# Patient Record
Sex: MALE | Race: WHITE | HISPANIC OR LATINO | Employment: UNEMPLOYED | URBAN - METROPOLITAN AREA
[De-identification: names, ages, dates, MRNs, and addresses within clinical notes are randomized per-mention and may not be internally consistent; named-entity substitution may affect disease eponyms.]

---
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
## 2024-01-12 ENCOUNTER — SOCIAL WORK (OUTPATIENT)
Dept: BEHAVIORAL/MENTAL HEALTH CLINIC | Facility: CLINIC | Age: 19
End: 2024-01-12
Payer: COMMERCIAL

## 2024-01-12 DIAGNOSIS — F33.0 MILD RECURRENT MAJOR DEPRESSION (HCC): Primary | ICD-10-CM

## 2024-01-12 PROCEDURE — 90834 PSYTX W PT 45 MINUTES: CPT | Performed by: SOCIAL WORKER

## 2024-01-12 NOTE — PSYCH
"Behavioral Health Psychotherapy Progress Note    Psychotherapy Provided: Individual Psychotherapy     1. Mild recurrent major depression (HCC)            Goals addressed in session: Goal 1     DATA: Therapist focuses the session on building rapport and talking with Dmitriy about identifying and expressing emotions.  Dmitriy reports that he has no concerns about anything but does mention that he struggles with two peers at school.  He wishes to avoid hanging out with them and worries about telling them because he does not want to make them angry.  Dmitriy is able to narrow down that he does not want to sit with them at lunch.  We role play telling them that he wants to sit by himself.  Dmitriy states that he thinks he can do that and plans to follow through with expressing his feelings.  Otherwise Dmitriy states that he doesn't feel comfortable talking about his feelings and refuses today to talk about the past which is something he stated he wanted to work on previously.  We conclude by creating his crisis plan.  A copy is given to Dmitriy.  During this session, this clinician used the following therapeutic modalities: Engagement Strategies, Client-centered Therapy, and Supportive Psychotherapy    Substance Abuse was not addressed during this session. If the client is diagnosed with a co-occurring substance use disorder, please indicate any changes in the frequency or amount of use: NA. Stage of change for addressing substance use diagnoses: No substance use/Not applicable    ASSESSMENT:  Dmitriy Peña presents with a Euthymic/ normal mood.     his affect is Normal range and intensity, which is congruent, with his mood and the content of the session. The client has made progress on their goals.     Dmitriy Peña presents with a low risk of suicide, low risk of self-harm, and low risk of harm to others.    For any risk assessment that surpasses a \"low\" rating, a safety plan must be developed.    A safety plan " was indicated: no  If yes, describe in detail NA    PLAN: Between sessions, Dmitriy Peña will read over his safety plan and identify his concerns to discuss next session. At the next session, the therapist will use Engagement Strategies, Client-centered Therapy, and Supportive Psychotherapy to address concerns involving the past.    Behavioral Health Treatment Plan and Discharge Planning: Dmitriy Peña is aware of and agrees to continue to work on their treatment plan. They have identified and are working toward their discharge goals. yes    Visit start and stop times:    01/12/24  Start Time: 1400  Stop Time: 1445  Total Visit Time: 45 minutes   Negative

## 2024-01-12 NOTE — BH CRISIS PLAN
"Client Name: Dmitriy Peña       Client YOB: 2005    [facility] Safety Plan      Creation Date: 1/12/24    Created By: Claire Woodall LCSW       Step 1: Warning Signs:   Warning Signs   Feel heaviness in back and chest, feel really sad, repeatly hearing \"there's no point in trying anymore,\" also hear \"you're a screwup\"            Step 2: Internal Coping Strategies:   Internal Coping Strategies   Taking a shower, playing video games, taking a walk on the trails            Step 3: People and social settings that provide distraction:   Name Contact Information   Marcia Peña (mother) [phone]   Migdalia Peña (sister) [phone]   Maida Yeager [phone]    Places   [place] in [place] by [place]           Step 4: People whom I can ask for help during a crisis:      Name Contact Information    Maida Yeager [phone]      Step 5: Professionals or agencies I can contact during a crisis:      Clinican/Agency Name Phone Emergency Contact    [facility] [place] [phone]       [facility] Emergency Department Emergency Department Phone Emergency Department Address    [facility] [phone]         Crisis Phone Numbers:   Suicide Prevention Lifeline: Call or Text  243 Crisis Text Line: Text HOME to 202-301   Please note: Some [facility] do not have a separate number for Child/Adolescent specific crisis. If your county is not listed under Child/Adolescent, please call the adult number for your county      Adult Crisis Numbers: Child/Adolescent Crisis Numbers   [facility]: [phone] [facility]: [phone]   [facility]: [phone] [facility]: [phone]   [facility]: [phone] [place]: [phone]   [facility]: [phone] Critical access hospital/[facility]: [phone]   Critical access hospital/[facility]: [phone]   [facility]: [phone]   [facility]: [phone]   [facility]: [phone] (daytime) " [phone] (after hours, weekends, holidays)      Step 6: Making the environment safer (plan for lethal means safety):   Patient did not identify any lethal methods: Yes     Optional: What is most important to me and worth living for?   Girlfriend Maida  Working with nahomy Wilde Safety Plan. Trinidad Messina and Gagan Li. Used with permission of the authors.

## 2024-02-16 ENCOUNTER — SOCIAL WORK (OUTPATIENT)
Dept: BEHAVIORAL/MENTAL HEALTH CLINIC | Facility: CLINIC | Age: 19
End: 2024-02-16
Payer: COMMERCIAL

## 2024-02-16 DIAGNOSIS — F33.0 MILD RECURRENT MAJOR DEPRESSION (HCC): Primary | ICD-10-CM

## 2024-02-16 PROCEDURE — 90834 PSYTX W PT 45 MINUTES: CPT | Performed by: SOCIAL WORKER
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
## 2024-03-04 ENCOUNTER — OFFICE VISIT (OUTPATIENT)
Dept: PSYCHIATRY | Facility: CLINIC | Age: 19
End: 2024-03-04
Payer: COMMERCIAL

## 2024-03-04 DIAGNOSIS — F40.10 SOCIAL ANXIETY DISORDER: Primary | ICD-10-CM

## 2024-03-04 DIAGNOSIS — F33.1 MDD (MAJOR DEPRESSIVE DISORDER), RECURRENT EPISODE, MODERATE (HCC): ICD-10-CM

## 2024-03-04 PROCEDURE — 90833 PSYTX W PT W E/M 30 MIN: CPT | Performed by: STUDENT IN AN ORGANIZED HEALTH CARE EDUCATION/TRAINING PROGRAM

## 2024-03-04 PROCEDURE — 99214 OFFICE O/P EST MOD 30 MIN: CPT | Performed by: STUDENT IN AN ORGANIZED HEALTH CARE EDUCATION/TRAINING PROGRAM

## 2024-03-04 RX ORDER — FLUOXETINE 10 MG/1
10 CAPSULE ORAL DAILY
Qty: 90 CAPSULE | Refills: 0 | Status: SHIPPED | OUTPATIENT
Start: 2024-03-04 | End: 2024-06-02

## 2024-03-04 RX ORDER — HYDROXYZINE HYDROCHLORIDE 25 MG/1
25 TABLET, FILM COATED ORAL EVERY 6 HOURS PRN
Qty: 100 TABLET | Refills: 0 | Status: SHIPPED | OUTPATIENT
Start: 2024-03-04
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
## 2024-05-10 ENCOUNTER — TELEPHONE (OUTPATIENT)
Age: 19
End: 2024-05-10

## 2024-05-10 NOTE — TELEPHONE ENCOUNTER
Writer briefly spoke to patient. Patient was busy at this time. Patient ask writer to call back Monday to schedule appointment.   So writer will reach back out on Monday.

## 2024-05-17 NOTE — TELEPHONE ENCOUNTER
Writer spoke to patient is waiting for provider to ok the appointment date and time. Then writer will call patient and let him know if we can do that date.

## 2024-05-17 NOTE — TELEPHONE ENCOUNTER
Writer called patient back and let him know the provider is able to do Wednesday 7/24/2024 @ 9am -11 am with  for testing.     Patient was then removed from the wait list due to patient being scheduled.

## 2024-05-31 ENCOUNTER — TELEPHONE (OUTPATIENT)
Dept: PSYCHIATRY | Facility: CLINIC | Age: 19
End: 2024-05-31

## 2024-06-06 ENCOUNTER — DOCUMENTATION (OUTPATIENT)
Dept: PSYCHIATRY | Facility: CLINIC | Age: 19
End: 2024-06-06

## 2024-06-08 DIAGNOSIS — F40.10 SOCIAL ANXIETY DISORDER: ICD-10-CM

## 2024-06-08 DIAGNOSIS — F33.1 MDD (MAJOR DEPRESSIVE DISORDER), RECURRENT EPISODE, MODERATE (HCC): ICD-10-CM

## 2024-06-10 ENCOUNTER — DOCUMENTATION (OUTPATIENT)
Dept: BEHAVIORAL/MENTAL HEALTH CLINIC | Facility: CLINIC | Age: 19
End: 2024-06-10

## 2024-06-10 DIAGNOSIS — F33.0 MILD RECURRENT MAJOR DEPRESSION (HCC): Primary | ICD-10-CM

## 2024-06-10 RX ORDER — FLUOXETINE 10 MG/1
10 CAPSULE ORAL DAILY
Qty: 90 CAPSULE | Refills: 0 | Status: SHIPPED | OUTPATIENT
Start: 2024-06-10
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
## 2024-07-01 ENCOUNTER — TELEPHONE (OUTPATIENT)
Age: 19
End: 2024-07-01

## 2024-07-09 NOTE — TELEPHONE ENCOUNTER
Writer completed the application with  for the non par authorization. Writer will update this encounter when the authorization is obtained.    Sign     Unable to Pay for Housing in the Last Year: Not on file     Number of Places Lived in the Last Year: Not on file     Unstable Housing in the Last Year: No       Family History   Problem Relation Age of Onset    Diabetes Mother     Cancer Mother     Cancer Father     No Known Problems Sister     Diabetes Brother     No Known Problems Brother     No Known Problems Brother     No Known Problems Brother     No Known Problems Brother     No Known Problems Brother        Current Outpatient Medications:     traMADol (ULTRAM) 50 MG tablet, Take 1 tablet by mouth every 6 hours as needed for Pain for up to 14 days. Intended supply: 7 days. Take lowest dose possible to manage pain Max Daily Amount: 200 mg, Disp: 56 tablet, Rfl: 0    ibuprofen (ADVIL;MOTRIN) 800 MG tablet, Take 1 tablet by mouth 3 times daily as needed for Pain, Disp: 90 tablet, Rfl: 1    lisinopril (PRINIVIL;ZESTRIL) 40 MG tablet, TAKE 1 TABLET BY MOUTH EVERY DAY, Disp: 90 tablet, Rfl: 3    amLODIPine (NORVASC) 10 MG tablet, TAKE 1 TABLET BY MOUTH EVERY DAY, Disp: 90 tablet, Rfl: 3    spironolactone (ALDACTONE) 25 MG tablet, TAKE 1 TABLET BY MOUTH EVERY DAY, Disp: 90 tablet, Rfl: 3    fluticasone (FLONASE) 50 MCG/ACT nasal spray, SPRAY 1 SPRAY INTO EACH NOSTRIL EVERY DAY Strength: 50 MCG/ACT, Disp: 1 each, Rfl: 11    fluticasone-umeclidin-vilant (TRELEGY ELLIPTA) 200-62.5-25 MCG/ACT AEPB inhaler, Inhale 1 puff into the lungs daily, Disp: 60 each, Rfl: 11    empagliflozin (JARDIANCE) 25 MG tablet, Take 1 tablet by mouth daily To replace 10 mg, Disp: 90 tablet, Rfl: 3    atorvastatin (LIPITOR) 40 MG tablet, Take 1 tablet by mouth daily To replace simvastatin, Disp: 90 tablet, Rfl: 3    furosemide (LASIX) 20 MG tablet, TAKE 1 TO 2 TABLETS BY MOUTH TWICE A DAY (Patient taking differently: 2 tablets daily TAKE 1 TO 2 TABLETS BY MOUTH TWICE A DAY), Disp: 360 tablet, Rfl: 3    glipiZIDE (GLUCOTROL XL) 5 MG extended release tablet, TAKE 2 TABLETS BY MOUTH

## 2024-07-10 ENCOUNTER — OFFICE VISIT (OUTPATIENT)
Dept: PSYCHIATRY | Facility: CLINIC | Age: 19
End: 2024-07-10

## 2024-07-10 ENCOUNTER — TELEMEDICINE (OUTPATIENT)
Dept: BEHAVIORAL/MENTAL HEALTH CLINIC | Facility: CLINIC | Age: 19
End: 2024-07-10
Payer: COMMERCIAL

## 2024-07-10 DIAGNOSIS — F33.0 MILD RECURRENT MAJOR DEPRESSION (HCC): Primary | ICD-10-CM

## 2024-07-10 DIAGNOSIS — F90.9 ATTENTION DEFICIT HYPERACTIVITY DISORDER (ADHD), UNSPECIFIED ADHD TYPE: ICD-10-CM

## 2024-07-10 DIAGNOSIS — F33.1 MDD (MAJOR DEPRESSIVE DISORDER), RECURRENT EPISODE, MODERATE (HCC): Primary | ICD-10-CM

## 2024-07-10 DIAGNOSIS — F40.10 SOCIAL ANXIETY DISORDER: ICD-10-CM

## 2024-07-10 PROCEDURE — 90834 PSYTX W PT 45 MINUTES: CPT | Performed by: SOCIAL WORKER

## 2024-07-10 RX ORDER — FLUOXETINE HYDROCHLORIDE 20 MG/1
20 CAPSULE ORAL
Qty: 30 CAPSULE | Refills: 0 | Status: SHIPPED | OUTPATIENT
Start: 2024-07-10 | End: 2024-08-09

## 2024-07-10 RX ORDER — ATOMOXETINE 25 MG/1
25 CAPSULE ORAL
Qty: 30 CAPSULE | Refills: 0 | Status: SHIPPED | OUTPATIENT
Start: 2024-07-10 | End: 2024-08-09

## 2024-07-10 RX ORDER — HYDROXYZINE HYDROCHLORIDE 25 MG/1
25 TABLET, FILM COATED ORAL EVERY 6 HOURS PRN
Qty: 100 TABLET | Refills: 0 | Status: SHIPPED | OUTPATIENT
Start: 2024-07-10

## 2024-07-10 NOTE — PSYCH
"Behavioral Health Psychotherapy Progress Note    Psychotherapy Provided: {PSYCHOTHERAPY:15368}    1. Mild recurrent major depression (HCC)            Goals addressed in session: {GOALS:95660}     DATA: ***  During this session, this clinician used the following therapeutic modalities: {Therapeutic Modalities:77083}    Substance Abuse {Was/was not:20590} addressed during this session. If the client is diagnosed with a co-occurring substance use disorder, please indicate any changes in the frequency or amount of use: ***. Stage of change for addressing substance use diagnoses: {Psych Substance Abuse Stage of Change:03649}    ASSESSMENT:  Dmitriy Peña presents with a {Psych/BH Mood:80022::\"Euthymic/ normal\"} mood.     his affect is {Psych/BH Affect:93675::\"Normal range and intensity\"}, which is {Congruent/Non Congruent:18724}, with his mood and the content of the session. The client {HAS/HAS NOT:33712} made progress on their goals.    *** Dmitriy Peña presents with a {PSYCH Suicide/Homicide Risk:43383} risk of suicide, {PSYCH Suicide/Homicide Risk:89455} risk of self-harm, and {PSYCH Suicide/Homicide Risk:44932} risk of harm to others.    For any risk assessment that surpasses a \"low\" rating, a safety plan must be developed.    A safety plan was indicated: {YES/NO:20200}  If yes, describe in detail ***    PLAN: Between sessions, Dmitriy Peña will ***. At the next session, the therapist will use {Therapeutic Modalities:85404} to address ***.    Behavioral Health Treatment Plan and Discharge Planning: Dmitriy Peña is aware of and agrees to continue to work on their treatment plan. They have identified and are working toward their discharge goals. {YES/NO:20200}    Visit start and stop times:    07/10/24       Virtual Regular Visit    Verification of patient location:    Patient is located at {[place] Patient Location:[address]} in the following state in which I hold an active license {[employer] " patient location:[address]}      Assessment/Plan:    Problem List Items Addressed This Visit       Mild recurrent major depression (HCC) - Primary       Goals addressed in session: {GOALS:22611}          Reason for visit is No chief complaint on file.       Encounter provider Claire Woodall LCSW      Recent Visits  No visits were found meeting these conditions.  Showing recent visits within past 7 days and meeting all other requirements  Future Appointments  No visits were found meeting these conditions.  Showing future appointments within next 150 days and meeting all other requirements       The patient was identified by name and date of birth. Dmitriy Peña was informed that this is a telemedicine visit and that the visit is being conducted through{[employer] VIRTUAL VISIT MEDIUM:20253}.  {Telemedicine confidentiality :79394} {Telemedicine participants:79014}  He acknowledged consent and understanding of privacy and security of the video platform. The patient has agreed to participate and understands they can discontinue the visit at any time.    Patient is aware this is a billable service.     Subjective  Dmitriy Peña is a 18 y.o. male *** .      HPI     Past Medical History:   Diagnosis Date    Depression        Past Surgical History:   Procedure Laterality Date    HERNIA REPAIR      TOE SURGERY         Current Outpatient Medications   Medication Sig Dispense Refill    atoMOXetine (STRATTERA) 25 mg capsule Take 1 capsule (25 mg total) by mouth daily after breakfast 30 capsule 0    FLUoxetine (PROzac) 20 mg capsule Take 1 capsule (20 mg total) by mouth daily after breakfast 30 capsule 0    hydrOXYzine HCL (ATARAX) 25 mg tablet Take 1 tablet (25 mg total) by mouth every 6 (six) hours as needed for anxiety for up to 100 doses 100 tablet 0     No current facility-administered medications for this visit.        No Known Allergies    Review of Systems    Video Exam    There were no vitals filed for this  visit.    Physical Exam     Visit Time    Visit Start Time: ***  Visit Stop Time: ***  Total Visit Duration: {Psych Total Visit Time:37471}

## 2024-07-10 NOTE — PSYCH
"Behavioral Health Psychotherapy Progress Note    Psychotherapy Provided: Individual Psychotherapy     1. Mild recurrent major depression (HCC)            Goals addressed in session: Goal 1     DATA: Attila states that he has been feeling very anxious about the fact that his uncle \"wants [him] to get [his] priorities straight.\"  Attila explains that he is off for the summer and has not been doing much of anything and his uncle is asking that he contribute more financially.  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  Therapist explores with Attila a plan for improved sleep schedule.  Attila agrees to place his phone outside his bedroom and to use a dehumidifier to create white noise.  Therapist also guides him through two visualizations - one being down by the river and the other holding his cat Mica to be used as resources when having difficulty falling asleep or feeling anxious.  Attila established the goal of going to sleep at 9:00 pm at night and waking at 8:00 am.  He also sets a goal of volunteering Mondays and Fridays at the cat shelter.  We conclude by discussing therapist's resignation.  Attila agrees to proceed to the ER or the [facility] if feeling like he may hurt himself or overwhelming anxiety.  He also agrees to therapist having him placed on a waitlist for a new therapist.  During this session, this clinician used the following therapeutic modalities: Client-centered Therapy, Mindfulness-based Strategies, and Supportive Psychotherapy    Substance Abuse was not addressed during this session. If the client is diagnosed with a co-occurring substance use disorder, please indicate any changes in the frequency or amount of use: NA. Stage of change for addressing substance use diagnoses: No substance use/Not applicable    ASSESSMENT:  Dmitriy Peña presents with a Anxious mood.     his affect is Blunted, which is congruent, with his " "mood and the content of the session. The client has made progress on their goals.     Dmitriy Peña presents with a none risk of suicide, none risk of self-harm, and none risk of harm to others.    For any risk assessment that surpasses a \"low\" rating, a safety plan must be developed.    A safety plan was indicated: no  If yes, describe in detail NA    PLAN: Max will be placed on waitlist for a new therapist.      Visit start and stop times:    07/10/24  Start Time: 1258  Stop Time: 1340  Total Visit Time: 42 minutes  Virtual Regular Visit    Verification of patient location:    Patient is located at Home in the following state in which I hold an active license [place]      Assessment/Plan:    Problem List Items Addressed This Visit       Mild recurrent major depression (HCC) - Primary       Goals addressed in session: Goal 1          Reason for visit is No chief complaint on file.       Encounter provider AMINA Thompson      Recent Visits  No visits were found meeting these conditions.  Showing recent visits within past 7 days and meeting all other requirements  Future Appointments  No visits were found meeting these conditions.  Showing future appointments within next 150 days and meeting all other requirements       The patient was identified by name and date of birth. Dmitriy Peña was informed that this is a telemedicine visit and that the visit is being conducted throughthe Epic Embedded platform. He agrees to proceed..  My office door was closed. No one else was in the room.  He acknowledged consent and understanding of privacy and security of the video platform. The patient has agreed to participate and understands they can discontinue the visit at any time.    Patient is aware this is a billable service.     Subjective  Dmitriy Peña is a 18 y.o. male  .      HPI     Past Medical History:   Diagnosis Date    Depression        Past Surgical History:   Procedure Laterality Date    HERNIA REPAIR      " TOE SURGERY         Current Outpatient Medications   Medication Sig Dispense Refill    atoMOXetine (STRATTERA) 25 mg capsule Take 1 capsule (25 mg total) by mouth daily after breakfast 30 capsule 0    FLUoxetine (PROzac) 20 mg capsule Take 1 capsule (20 mg total) by mouth daily after breakfast 30 capsule 0    hydrOXYzine HCL (ATARAX) 25 mg tablet Take 1 tablet (25 mg total) by mouth every 6 (six) hours as needed for anxiety for up to 100 doses 100 tablet 0     No current facility-administered medications for this visit.        No Known Allergies    Review of Systems    Video Exam    There were no vitals filed for this visit.    Physical Exam
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
## 2024-07-12 ENCOUNTER — TELEPHONE (OUTPATIENT)
Dept: PSYCHIATRY | Facility: CLINIC | Age: 19
End: 2024-07-12

## 2024-07-22 ENCOUNTER — TELEPHONE (OUTPATIENT)
Age: 19
End: 2024-07-22

## 2024-07-22 NOTE — TELEPHONE ENCOUNTER
Writer returned patients call. Let him know that we could do the intake VV but the testing would need to be done @[facility] and did he want to cancel for now and reschedule when he has a ride situated. Patient choose to wait for testing until he knew for sure that he could get a ride to the testing appointments.

## 2024-07-22 NOTE — TELEPHONE ENCOUNTER
Writer received call from patient stating he wouldn't be able to make it. He can't get a ride. Writer let patient know I would reach out to provider to see if we can do the intake VV. Writer let patient know that I would let him know what we could do but for testing he would have to come to the [address] [place] [address] location.

## 2024-08-21 ENCOUNTER — OFFICE VISIT (OUTPATIENT)
Dept: URGENT CARE | Facility: CLINIC | Age: 19
End: 2024-08-21
Payer: COMMERCIAL

## 2024-08-21 VITALS
TEMPERATURE: 98 F | OXYGEN SATURATION: 100 % | WEIGHT: 211 LBS | HEART RATE: 82 BPM | HEIGHT: 66 IN | BODY MASS INDEX: 33.91 KG/M2 | SYSTOLIC BLOOD PRESSURE: 138 MMHG | DIASTOLIC BLOOD PRESSURE: 78 MMHG | RESPIRATION RATE: 18 BRPM

## 2024-08-21 DIAGNOSIS — K02.9 DENTAL DECAY: ICD-10-CM

## 2024-08-21 DIAGNOSIS — K08.89 PAIN, DENTAL: Primary | ICD-10-CM

## 2024-08-21 PROCEDURE — S9083 URGENT CARE CENTER GLOBAL: HCPCS | Performed by: NURSE PRACTITIONER

## 2024-08-21 PROCEDURE — 99214 OFFICE O/P EST MOD 30 MIN: CPT | Performed by: NURSE PRACTITIONER

## 2024-08-21 RX ORDER — AMOXICILLIN 500 MG/1
500 CAPSULE ORAL EVERY 8 HOURS SCHEDULED
Qty: 21 CAPSULE | Refills: 0 | Status: SHIPPED | OUTPATIENT
Start: 2024-08-21 | End: 2024-08-28

## 2024-08-21 RX ORDER — CHLORHEXIDINE GLUCONATE ORAL RINSE 1.2 MG/ML
15 SOLUTION DENTAL 2 TIMES DAILY
Qty: 473 ML | Refills: 0 | Status: SHIPPED | OUTPATIENT
Start: 2024-08-21

## 2024-08-21 NOTE — PATIENT INSTRUCTIONS
You have been prescribed amoxicillin and peridex oral rinse.  Take and use as prescribed.   Follow up with a dentist  Follow up with your PCP in 3-5 days  Go to the ED if symptoms worsen

## 2024-08-21 NOTE — PROGRESS NOTES
[facility]        NAME: Dmitriy Peña is a 18 y.o. male  : 2005    MRN: [id number]  DATE: 2024  TIME: 12:06 PM    Assessment and Plan   Pain, dental [K08.89]  1. Pain, dental  amoxicillin (AMOXIL) 500 mg capsule    chlorhexidine (PERIDEX) 0.12 % solution      2. Dental decay  amoxicillin (AMOXIL) 500 mg capsule    chlorhexidine (PERIDEX) 0.12 % solution            Patient Instructions       Follow up with PCP in 3-5 days.  Proceed to  ER if symptoms worsen.    If tests have been performed at [facility], our office will contact you with results if changes need to be made to the care plan discussed with you at the visit.  You can review your full results on [facility].    You have been prescribed amoxicillin and peridex oral rinse.  Take and use as prescribed.   Follow up with a dentist  Follow up with your PCP in 3-5 days  Go to the ED if symptoms worsen       Chief Complaint     Chief Complaint   Patient presents with    Dental Pain     Toothache x 2 days          History of Present Illness       This is a 18 year old male who states has dental pain in left upper x 2 days. HE has not contacted a dentist. States last motrin was at 10am.   PMH is listed.     Dental Pain         Review of Systems   Review of Systems   Constitutional: Negative.    HENT:  Positive for dental problem.    Eyes: Negative.    Respiratory: Negative.     Cardiovascular: Negative.    Gastrointestinal: Negative.    Endocrine: Negative.    Genitourinary: Negative.    Musculoskeletal: Negative.    Skin: Negative.    Allergic/Immunologic: Negative.    Neurological: Negative.    Hematological: Negative.    Psychiatric/Behavioral: Negative.           Current Medications       Current Outpatient Medications:     amoxicillin (AMOXIL) 500 mg capsule, Take 1 capsule (500 mg total) by mouth every 8 (eight) hours for 7 days, Disp: 21 capsule, Rfl: 0    chlorhexidine (PERIDEX) 0.12 % solution, Apply 15 mL to the  "mouth or throat 2 (two) times a day, Disp: 473 mL, Rfl: 0    atoMOXetine (STRATTERA) 25 mg capsule, Take 1 capsule (25 mg total) by mouth daily after breakfast, Disp: 30 capsule, Rfl: 0    FLUoxetine (PROzac) 20 mg capsule, Take 1 capsule (20 mg total) by mouth daily after breakfast, Disp: 30 capsule, Rfl: 0    hydrOXYzine HCL (ATARAX) 25 mg tablet, Take 1 tablet (25 mg total) by mouth every 6 (six) hours as needed for anxiety for up to 100 doses, Disp: 100 tablet, Rfl: 0    Current Allergies     Allergies as of 08/21/2024    (No Known Allergies)            The following portions of the patient's history were reviewed and updated as appropriate: allergies, current medications, past family history, past medical history, past social history, past surgical history and problem list.     Past Medical History:   Diagnosis Date    Depression        Past Surgical History:   Procedure Laterality Date    HERNIA REPAIR      TOE SURGERY         Family History   Problem Relation Age of Onset    No Known Problems Mother     No Known Problems Father     Cancer Maternal Grandfather          Medications have been verified.        Objective   /78   Pulse 82   Temp 98 °F (36.7 °C)   Resp 18   Ht 5' 6\" (1.676 m)   Wt 95.7 kg (211 lb)   SpO2 100%   BMI 34.06 kg/m²   No LMP for male patient.       Physical Exam     Physical Exam  Vitals and nursing note reviewed.   Constitutional:       General: He is not in acute distress.     Appearance: Normal appearance. He is obese. He is not ill-appearing, toxic-appearing or diaphoretic.   HENT:      Head: Normocephalic and atraumatic.      Nose: Nose normal.      Mouth/Throat:      Mouth: Mucous membranes are moist.     Eyes:      Extraocular Movements: Extraocular movements intact.   Cardiovascular:      Rate and Rhythm: Normal rate.      Pulses: Normal pulses.   Pulmonary:      Effort: Pulmonary effort is normal.   Musculoskeletal:         General: Normal range of motion.      " Cervical back: Normal range of motion.   Skin:     General: Skin is warm and dry.      Capillary Refill: Capillary refill takes less than 2 seconds.   Neurological:      General: No focal deficit present.      Mental Status: He is alert and oriented to person, place, and time.   Psychiatric:         Mood and Affect: Mood normal.         Behavior: Behavior normal.         Thought Content: Thought content normal.         Judgment: Judgment normal.

## 2024-09-11 ENCOUNTER — OFFICE VISIT (OUTPATIENT)
Dept: PSYCHIATRY | Facility: CLINIC | Age: 19
End: 2024-09-11
Payer: COMMERCIAL

## 2024-09-11 DIAGNOSIS — F33.1 MDD (MAJOR DEPRESSIVE DISORDER), RECURRENT EPISODE, MODERATE (HCC): ICD-10-CM

## 2024-09-11 DIAGNOSIS — F90.9 ATTENTION DEFICIT HYPERACTIVITY DISORDER (ADHD), UNSPECIFIED ADHD TYPE: ICD-10-CM

## 2024-09-11 DIAGNOSIS — F40.10 SOCIAL ANXIETY DISORDER: ICD-10-CM

## 2024-09-11 PROCEDURE — 90833 PSYTX W PT W E/M 30 MIN: CPT | Performed by: STUDENT IN AN ORGANIZED HEALTH CARE EDUCATION/TRAINING PROGRAM

## 2024-09-11 PROCEDURE — 99214 OFFICE O/P EST MOD 30 MIN: CPT | Performed by: STUDENT IN AN ORGANIZED HEALTH CARE EDUCATION/TRAINING PROGRAM

## 2024-09-11 RX ORDER — IBUPROFEN 800 MG/1
800 TABLET, FILM COATED ORAL EVERY 8 HOURS PRN
COMMUNITY
Start: 2024-08-22

## 2024-09-11 RX ORDER — HYDROXYZINE HYDROCHLORIDE 25 MG/1
25 TABLET, FILM COATED ORAL EVERY 6 HOURS PRN
Qty: 100 TABLET | Refills: 0 | Status: SHIPPED | OUTPATIENT
Start: 2024-09-11

## 2024-09-11 RX ORDER — ATOMOXETINE 25 MG/1
25 CAPSULE ORAL
Qty: 30 CAPSULE | Refills: 2 | Status: SHIPPED | OUTPATIENT
Start: 2024-09-11 | End: 2024-12-10

## 2024-09-11 NOTE — BH TREATMENT PLAN
TREATMENT PLAN (Medication Management Only)        [facility] - [facility]YCHIATRIC [facility]    Name and Date of Birth:  Dmitriy Peña 18 y.o. 2005  Date of Treatment Plan: September 11, 2024  Diagnosis/Diagnoses:    1. Attention deficit hyperactivity disorder (ADHD), unspecified ADHD type    2. Social anxiety disorder    3. MDD (major depressive disorder), recurrent episode, moderate (HCC)      Strengths/Personal Resources for Self-Care: taking medications as prescribed, ability to adapt to life changes, ability to communicate needs, ability to communicate well, ability to listen, ability to reason, ability to understand psychiatric illness, average or above intelligence, family ties, general fund of knowledge, good physical health, good understanding of illness, motivation for treatment, ability to negotiate basic needs, being resoureceful, self-reliance, sense of humor, special hobby/interest.  Area/Areas of need (in own words): anxiety symptoms, depressive symptoms, sleep problems, attention and concentration problems, anger control  1. Long Term Goal: improve compulsive behavior.  Target Date:6 months - 3/11/2025  Person/Persons responsible for completion of goal: Dmitriy  2.  Short Term Objective (s) - How will we reach this goal?:   A. Provider new recommended medication/dosage changes and/or continue medication(s): continue current medications as prescribed Prozac.  B. Take psychiatric medications responsibly.  C. Keep all scheduled appointments.  Target Date:6 months - 3/11/2025  Person/Persons Responsible for Completion of Goal: Dmitriy  Progress Towards Goals: starting treatment  Treatment Modality: medication management with psychotherapy every 3 months, referral for individual psychotherapy, referral for neuropsychological assessment  Review due 180 days from date of this plan: 6 months - 3/11/2025  Expected length of service: ongoing treatment  My Physician and I have  developed this plan together and I agree to work on the goals and objectives. I understand the treatment goals that were developed for my treatment.
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
## 2024-09-19 ENCOUNTER — APPOINTMENT (OUTPATIENT)
Dept: RADIOLOGY | Facility: CLINIC | Age: 19
End: 2024-09-19
Payer: COMMERCIAL

## 2024-09-19 ENCOUNTER — OFFICE VISIT (OUTPATIENT)
Dept: URGENT CARE | Facility: CLINIC | Age: 19
End: 2024-09-19
Payer: COMMERCIAL

## 2024-09-19 VITALS
TEMPERATURE: 98 F | SYSTOLIC BLOOD PRESSURE: 130 MMHG | HEART RATE: 62 BPM | WEIGHT: 200 LBS | HEIGHT: 65 IN | RESPIRATION RATE: 18 BRPM | OXYGEN SATURATION: 98 % | DIASTOLIC BLOOD PRESSURE: 70 MMHG | BODY MASS INDEX: 33.32 KG/M2
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
## 2024-09-19 DIAGNOSIS — M79.652 ACUTE PAIN OF LEFT THIGH: ICD-10-CM

## 2024-09-19 DIAGNOSIS — S69.92XA LEFT WRIST INJURY, INITIAL ENCOUNTER: ICD-10-CM

## 2024-09-19 DIAGNOSIS — S70.12XA HEMATOMA OF LEFT THIGH, INITIAL ENCOUNTER: ICD-10-CM

## 2024-09-19 DIAGNOSIS — S63.502A WRIST SPRAIN, LEFT, INITIAL ENCOUNTER: Primary | ICD-10-CM

## 2024-09-19 PROCEDURE — S9083 URGENT CARE CENTER GLOBAL: HCPCS | Performed by: NURSE PRACTITIONER

## 2024-09-19 PROCEDURE — 73552 X-RAY EXAM OF FEMUR 2/>: CPT

## 2024-09-19 PROCEDURE — 73110 X-RAY EXAM OF WRIST: CPT

## 2024-09-19 PROCEDURE — 99213 OFFICE O/P EST LOW 20 MIN: CPT | Performed by: NURSE PRACTITIONER

## 2024-09-19 NOTE — PATIENT INSTRUCTIONS
Your xray was preliminarily read by your provider.  A radiologist will read the xray and you will be notified if it is abnormal.    You are to Rest, Ice, compression (ace wrap, splint) elevate  You are to take tylenol and/or motrin for pain  Wear the brace for comfort and pain and take off and exercise several times a day.   If symptoms continue after 7 days - see orthopedics.  You have been given a referral - you can call for appointment    If tests have been performed at [facility], our office will contact you with results if changes need to be made to the care plan discussed with you at the visit.  You can review your full results on [facility] MyChart.
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
## 2024-10-24 ENCOUNTER — TELEPHONE (OUTPATIENT)
Age: 19
End: 2024-10-24

## 2024-10-24 ENCOUNTER — TELEPHONE (OUTPATIENT)
Dept: PSYCHIATRY | Facility: CLINIC | Age: 19
End: 2024-10-24

## 2024-10-24 NOTE — TELEPHONE ENCOUNTER
Pt removed from TT wait list due to being placed on for a NONA from Claire Clarke forwarded to Support Services.

## 2024-10-24 NOTE — TELEPHONE ENCOUNTER
Called and spoke to patient about NONA request. Offered TT therapist at [facility]. Patient stated that he does not drive and would need to discuss this with his ride first. Will call back if interested, will remain on wait list for a therapist closer to his home. Transfer to psych support services upon call back.    If patient calls back and accepts, can schedule with Yuko DUPREE

## 2024-11-04 ENCOUNTER — TELEPHONE (OUTPATIENT)
Dept: PSYCHIATRY | Facility: CLINIC | Age: 19
End: 2024-11-04

## 2024-11-15 ENCOUNTER — OFFICE VISIT (OUTPATIENT)
Dept: BEHAVIORAL/MENTAL HEALTH CLINIC | Facility: CLINIC | Age: 19
End: 2024-11-15
Payer: COMMERCIAL

## 2024-11-15 DIAGNOSIS — F33.0 MILD RECURRENT MAJOR DEPRESSION (HCC): Primary | ICD-10-CM

## 2024-11-15 PROCEDURE — 90834 PSYTX W PT 45 MINUTES: CPT

## 2024-11-18 NOTE — PSYCH
"Behavioral Health Psychotherapy Progress Note    Psychotherapy Provided: Individual Psychotherapy     1. Mild recurrent major depression (HCC)            Goals addressed in session: Goal 1     DATA: Met with Attila for NONA session. Session focused on rapport building and information gathering. Processed what he was attempting to work on with his previous therapist. Attila expressed that he struggles with depression and trauma related symptoms. He has moved often throughout his life due to \"mom having bad boyfriends.\" Attila currently lives with his uncle and cousin and attends [employer]. He expressed his uncle gets angry easily, but overall he is comfortable in the home. He expressed the last year and a half has been the longest length of stay he can remember being in one home. He did experience homelessness in his past. Attila has a small support system. He struggles with poor self-esteem. He is future focused and is hopeful to talk with recruiters for both [employer] and [employer]. He is hopeful to join the  to assist him in bettering his life. Attila recently had a break up with his girlfriend. He expressed sadness related to this.  Next session will focus on building his new treatment plan.   During this session, this clinician used the following therapeutic modalities: Client-centered Therapy and Supportive Psychotherapy    Substance Abuse was not addressed during this session. If the client is diagnosed with a co-occurring substance use disorder, please indicate any changes in the frequency or amount of use: n/a. Stage of change for addressing substance use diagnoses: No substance use/Not applicable    ASSESSMENT:  Dmitriy Peña presents with a Anxious mood.     his affect is Flat, which is congruent, with his mood and the content of the session. The client has not made progress on their goals.     Dmitriy Peña presents with a none risk of suicide, none risk of self-harm, and none risk of harm to " "others.    For any risk assessment that surpasses a \"low\" rating, a safety plan must be developed.    A safety plan was indicated: no  If yes, describe in detail n/a    PLAN: Between sessions, Dmitriy Peña will use natural supports and coping skills. At the next session, the therapist will use Client-centered Therapy and Cognitive Behavioral Therapy to address depression.    Behavioral Health Treatment Plan and Discharge Planning: Dmitriy Peña is aware of and agrees to continue to work on their treatment plan. They have identified and are working toward their discharge goals. yes    Visit start and stop times:    11/15/24  Start Time: 1445  Stop Time: 1530  Total Visit Time: 45 minutes  "

## 2024-12-13 ENCOUNTER — OFFICE VISIT (OUTPATIENT)
Dept: BEHAVIORAL/MENTAL HEALTH CLINIC | Facility: CLINIC | Age: 19
End: 2024-12-13
Payer: COMMERCIAL

## 2024-12-13 DIAGNOSIS — F33.0 MILD RECURRENT MAJOR DEPRESSION (HCC): Primary | ICD-10-CM

## 2024-12-13 PROCEDURE — 90834 PSYTX W PT 45 MINUTES: CPT

## 2024-12-16 NOTE — PSYCH
"Behavioral Health Psychotherapy Progress Note    Psychotherapy Provided: Individual Psychotherapy     1. Mild recurrent major depression (HCC)            Goals addressed in session: Goal 1     DATA: Met with Attila. Session began with open ended questions to gain feedback on mood. He expressed that he has been in better spirits since he is back with his girlfriend. He reports this helps lift his mood. He continues to struggle with sleep and bedwetting. Reviewed healthy sleep hygiene routines. Attila also reports he struggles with concentration and focus. This causes conflict in the home because he does not complete his needed chores. He reports his uncle gets angry and yells at him when he does not complete his chores. Discussed creating lists to better help with task management. Session focused on coping skills and tasks management. He wants to call the [employer]  since he did not come to his school yet. He did speak with [employer], but feels he will be a better fit for [employer].   During this session, this clinician used the following therapeutic modalities: Client-centered Therapy and Cognitive Behavioral Therapy    Substance Abuse was not addressed during this session. If the client is diagnosed with a co-occurring substance use disorder, please indicate any changes in the frequency or amount of use: n/a. Stage of change for addressing substance use diagnoses: No substance use/Not applicable    ASSESSMENT:  Dmitriy Peña presents with a Euthymic/ normal mood.     his affect is Normal range and intensity, which is congruent, with his mood and the content of the session. The client has not made progress on their goals.     Dmitriy Peña presents with a none risk of suicide, none risk of self-harm, and none risk of harm to others.    For any risk assessment that surpasses a \"low\" rating, a safety plan must be developed.    A safety plan was indicated: no  If yes, describe in detail n/a    PLAN: Between sessions, Dmitriy" Casey will use natural supports and coping skills. At the next session, the therapist will use Cognitive Behavioral Therapy and Mindfulness-based Strategies to address emotion regulation.    Behavioral Health Treatment Plan and Discharge Planning: Dmitriy Peña is aware of and agrees to continue to work on their treatment plan. They have identified and are working toward their discharge goals. yes    Depression Follow-up Plan Completed: Not applicable    Visit start and stop times:    12/13/24  Start Time: 1500  Stop Time: 1538  Total Visit Time: 38 minutes

## 2024-12-18 ENCOUNTER — OFFICE VISIT (OUTPATIENT)
Dept: URGENT CARE | Facility: CLINIC | Age: 19
End: 2024-12-18
Payer: COMMERCIAL

## 2024-12-18 VITALS
TEMPERATURE: 98 F | OXYGEN SATURATION: 96 % | HEART RATE: 74 BPM | SYSTOLIC BLOOD PRESSURE: 122 MMHG | DIASTOLIC BLOOD PRESSURE: 62 MMHG | RESPIRATION RATE: 20 BRPM

## 2024-12-18 DIAGNOSIS — R68.89 FLU-LIKE SYMPTOMS: Primary | ICD-10-CM

## 2024-12-18 DIAGNOSIS — Z20.828 CONTACT WITH OR EXPOSURE TO VIRAL DISEASE: ICD-10-CM

## 2024-12-18 DIAGNOSIS — R11.2 NAUSEA AND VOMITING, UNSPECIFIED VOMITING TYPE: ICD-10-CM

## 2024-12-18 PROCEDURE — S9083 URGENT CARE CENTER GLOBAL: HCPCS | Performed by: NURSE PRACTITIONER

## 2024-12-18 PROCEDURE — 87636 SARSCOV2 & INF A&B AMP PRB: CPT | Performed by: NURSE PRACTITIONER

## 2024-12-18 PROCEDURE — 99214 OFFICE O/P EST MOD 30 MIN: CPT | Performed by: NURSE PRACTITIONER

## 2024-12-18 RX ORDER — ONDANSETRON 4 MG/1
4 TABLET, FILM COATED ORAL EVERY 8 HOURS PRN
Qty: 20 TABLET | Refills: 0 | Status: SHIPPED | OUTPATIENT
Start: 2024-12-18

## 2024-12-18 RX ORDER — OSELTAMIVIR PHOSPHATE 75 MG/1
75 CAPSULE ORAL EVERY 12 HOURS SCHEDULED
Qty: 10 CAPSULE | Refills: 0 | Status: SHIPPED | OUTPATIENT
Start: 2024-12-18 | End: 2024-12-23

## 2024-12-18 NOTE — PATIENT INSTRUCTIONS
You are to take the tamiflu and zofran as prescribed.   You appear to have covid or flu.   You have covid and flu test pending.  Results should be back in 2 days.    You have flu like symptoms.  You are to rest. Drink gatorade or pedialyte for rehydration.    You are to eat a BRAT diet - bananas, rice, applesauce and toast.  You may try imodium for diarrhea.  Take tylenol or motrin for fever or pain.   You are to download  mychart for the results in 24-48 hours.  You will be notified if the results are positive.  You are to mask as long as you are coughing, feverish or exhibiting symptoms.    Follow up with your PCP in 2-3 days  Go to the ED if symptoms worsen.      You appear to have covid/symptoms.  You have a covid test pending.  You are to download [facility] mychart for the results in 24-48 hours.   You will be notified if results are +.    You are to take vitamin C, D3,  plain robitussin for cough.  Do not take cough suppressants; you want to take an expectorant.  Sleep on your stomach.   You are to quarantine as required per CDC guidelines of 24 hours.  Mask as long as you are ill, feverish or coughing.     See your PCP for follow up in 2-3 days.    Go to the ED if symptoms worsen or are severe.     If tests have been performed at [facility], our office will contact you with results if changes need to be made to the care plan discussed with you at the visit.  You can review your full results on [facility] MyChart.

## 2024-12-18 NOTE — PROGRESS NOTES
[facility]        NAME: Dmitriy Peña is a 19 y.o. male  : 2005    MRN: [id number]  DATE: 2024  TIME: 9:19 AM    Assessment and Plan   Flu-like symptoms [R68.89]  1. Flu-like symptoms  oseltamivir (TAMIFLU) 75 mg capsule    ondansetron (ZOFRAN) 4 mg tablet      2. Nausea and vomiting, unspecified vomiting type  oseltamivir (TAMIFLU) 75 mg capsule    ondansetron (ZOFRAN) 4 mg tablet      3. Contact with or exposure to viral disease  Covid/Flu- Office Collect Normal    Covid/Flu- Office Collect Normal    oseltamivir (TAMIFLU) 75 mg capsule    ondansetron (ZOFRAN) 4 mg tablet            Patient Instructions       Follow up with PCP in 3-5 days.  Proceed to  ER if symptoms worsen.    If tests have been performed at [facility], our office will contact you with results if changes need to be made to the care plan discussed with you at the visit.  You can review your full results on [facility].    You are to take the tamiflu and zofran as prescribed.   You appear to have covid or flu.   You have covid and flu test pending.  Results should be back in 2 days.    You have flu like symptoms.  You are to rest. Drink gatorade or pedialyte for rehydration.    You are to eat a BRAT diet - bananas, rice, applesauce and toast.  You may try imodium for diarrhea.  Take tylenol or motrin for fever or pain.   You are to download  [employer] for the results in 24-48 hours.  You will be notified if the results are positive.  You are to mask as long as you are coughing, feverish or exhibiting symptoms.    Follow up with your PCP in 2-3 days  Go to the ED if symptoms worsen.      You appear to have covid/symptoms.  You have a covid test pending.  You are to download  [employer] for the results in 24-48 hours.   You will be notified if results are +.    You are to take vitamin C, D3,  plain robitussin for cough.  Do not take cough suppressants; you want to take an expectorant.  Sleep on your stomach.    You are to quarantine as required per CDC guidelines of 24 hours.  Mask as long as you are ill, feverish or coughing.     See your PCP for follow up in 2-3 days.    Go to the ED if symptoms worsen or are severe.     If tests have been performed at [facility], our office will contact you with results if changes need to be made to the care plan discussed with you at the visit.  You can [facility] full [facility] on [facility]      Chief Complaint     Chief Complaint   Patient presents with    Vomiting    Headache     Head congestion         History of Present Illness       This is a 19 year old male who [facility] with c/o vomiting that started yesterday but had chills on Sunday with suspeted fever, body aches, nasal congestion.  He denies actual fevers and diarrhea. He is not flu vaccinated.  PMH is listed and reviewed.  Denies taking anything for his symptoms.      Vomiting   Associated symptoms include coughing, headaches and myalgias.   Headache      Review of Systems   Review of Systems   Constitutional: Negative.    HENT:  Positive for congestion.    Eyes: Negative.    Respiratory:  Positive for cough.    Cardiovascular: Negative.    Gastrointestinal:  Positive for vomiting.   Endocrine: Negative.    Genitourinary: Negative.    Musculoskeletal:  Positive for myalgias.   Skin: Negative.    Allergic/Immunologic: Negative.    Neurological:  Positive for headaches.   Hematological: Negative.    Psychiatric/Behavioral: Negative.           Current Medications       Current Outpatient Medications:     ondansetron (ZOFRAN) 4 mg tablet, Take 1 tablet (4 mg total) by mouth every 8 (eight) hours as needed for nausea or vomiting, Disp: 20 tablet, Rfl: 0    oseltamivir (TAMIFLU) 75 mg capsule, Take 1 capsule (75 mg total) by mouth every 12 (twelve) hours for 5 days, Disp: 10 capsule, Rfl: 0    atoMOXetine (STRATTERA) 25 mg capsule, Take 1 capsule (25 mg total) by mouth daily after breakfast, Disp: 30 capsule,  Rfl: 2    chlorhexidine (PERIDEX) 0.12 % solution, Apply 15 mL to the mouth or throat 2 (two) times a day (Patient not taking: Reported on 12/18/2024), Disp: 473 mL, Rfl: 0    FLUoxetine (PROzac) 20 mg capsule, Take 1 capsule (20 mg total) by mouth daily after breakfast, Disp: 90 capsule, Rfl: 0    hydrOXYzine HCL (ATARAX) 25 mg tablet, Take 1 tablet (25 mg total) by mouth every 6 (six) hours as needed for anxiety for up to 100 doses, Disp: 100 tablet, Rfl: 0    ibuprofen (MOTRIN) 800 mg tablet, Take 800 mg by mouth every 8 (eight) hours as needed for moderate pain, Disp: , Rfl:     Current Allergies     Allergies as of 12/18/2024    (No Known Allergies)            The following portions of the patient's history were reviewed and updated as appropriate: allergies, current medications, past family history, past medical history, past social history, past surgical history and problem list.     Past Medical History:   Diagnosis Date    Depression        Past Surgical History:   Procedure Laterality Date    HERNIA REPAIR      TOE SURGERY         Family History   Problem Relation Age of Onset    No Known Problems Mother     No Known Problems Father     Cancer Maternal Grandfather          Medications have been verified.        Objective   /62   Pulse 74   Temp 98 °F (36.7 °C)   Resp 20   SpO2 96%   No LMP for male patient.       Physical Exam     Physical Exam  Vitals and nursing note reviewed.   Constitutional:       General: He is not in acute distress.     Appearance: Normal appearance. He is obese. He is not ill-appearing, toxic-appearing or diaphoretic.   HENT:      Head: Normocephalic and atraumatic.      Right Ear: Tympanic membrane and ear canal normal.      Left Ear: Tympanic membrane and ear canal normal.      Nose: Congestion present. No rhinorrhea.   Eyes:      Extraocular Movements: Extraocular movements intact.   Cardiovascular:      Rate and Rhythm: Normal rate and regular rhythm.      Pulses:  Normal pulses.      Heart sounds: Normal heart sounds. No murmur heard.  Pulmonary:      Effort: Pulmonary effort is normal. No respiratory distress.      Breath sounds: Normal breath sounds. No stridor. No wheezing, rhonchi or rales.   Chest:      Chest wall: No tenderness.   Abdominal:      General: There is no distension.      Palpations: Abdomen is soft.      Tenderness: There is no abdominal tenderness.   Musculoskeletal:         General: Normal range of motion.      Cervical back: Normal range of motion and neck supple.   Skin:     General: Skin is warm and dry.      Capillary Refill: Capillary refill takes less than 2 seconds.   Neurological:      General: No focal deficit present.      Mental Status: He is alert and oriented to person, place, and time.   Psychiatric:         Mood and Affect: Mood normal.         Behavior: Behavior normal.         Thought Content: Thought content normal.         Judgment: Judgment normal.

## 2024-12-18 NOTE — LETTER
December 18, 2024     Patient: Dmitriy Peña   YOB: 2005   Date of Visit: 12/18/2024       To Whom it May Concern:    Dmitriy Peña was seen in my clinic on 12/18/2024. He may return to school on 12/23/2024 .    If you have any questions or concerns, please don't hesitate to call.         Sincerely,          FAVIO Choi        CC: No Recipients

## 2024-12-19 LAB
FLUAV RNA RESP QL NAA+PROBE: NEGATIVE
FLUBV RNA RESP QL NAA+PROBE: NEGATIVE
SARS-COV-2 RNA RESP QL NAA+PROBE: NEGATIVE

## 2024-12-31 ENCOUNTER — TELEMEDICINE (OUTPATIENT)
Dept: PSYCHIATRY | Facility: CLINIC | Age: 19
End: 2024-12-31
Payer: COMMERCIAL

## 2024-12-31 ENCOUNTER — TELEPHONE (OUTPATIENT)
Age: 19
End: 2024-12-31

## 2024-12-31 DIAGNOSIS — F90.9 ATTENTION DEFICIT HYPERACTIVITY DISORDER (ADHD), UNSPECIFIED ADHD TYPE: ICD-10-CM

## 2024-12-31 DIAGNOSIS — F33.1 MDD (MAJOR DEPRESSIVE DISORDER), RECURRENT EPISODE, MODERATE (HCC): ICD-10-CM

## 2024-12-31 DIAGNOSIS — F40.10 SOCIAL ANXIETY DISORDER: ICD-10-CM

## 2024-12-31 PROCEDURE — 99214 OFFICE O/P EST MOD 30 MIN: CPT | Performed by: STUDENT IN AN ORGANIZED HEALTH CARE EDUCATION/TRAINING PROGRAM

## 2024-12-31 RX ORDER — ATOMOXETINE 25 MG/1
25 CAPSULE ORAL
Qty: 90 CAPSULE | Refills: 1 | Status: SHIPPED | OUTPATIENT
Start: 2024-12-31 | End: 2025-06-29

## 2024-12-31 RX ORDER — HYDROXYZINE HYDROCHLORIDE 25 MG/1
25 TABLET, FILM COATED ORAL EVERY 6 HOURS PRN
Qty: 100 TABLET | Refills: 1 | Status: SHIPPED | OUTPATIENT
Start: 2024-12-31
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
## 2024-12-31 NOTE — TELEPHONE ENCOUNTER
Pt called to get his appt for 2 pm at 12/31/25 switched to a virtual visit due to running late. He would be too late for appt and didn't want to reschedule.  Writer messaged  Said to ask if it is ok. Writer switched appt.

## 2025-01-02 ENCOUNTER — TELEPHONE (OUTPATIENT)
Age: 20
End: 2025-01-02

## 2025-01-02 NOTE — TELEPHONE ENCOUNTER
PA for Straterra 25 mg Cap SUBMITTED to [facility]   via    []CMM-KEY:   [x]Surescripts-Case ID # [id number]   []Availity-Auth ID # NDC #   []Faxed to plan   []Other website   []Phone call Case ID #     []PA sent as URGENT    All office notes, labs and other pertaining documents and studies sent. Clinical questions answered. Awaiting determination from insurance company.     Turnaround time for your insurance to make a decision on your Prior Authorization can take 7-21 business days.

## 2025-01-03 NOTE — TELEPHONE ENCOUNTER
PA for Straterra 25 mg Cap APPROVED     Date(s) approved 1/2/2025-1/2/2026    Case #: [id number]     Patient advised by          []MyChart Message  [x]Phone call   []LMOM  []L/M to call office as no active Communication consent on file  []Unable to leave detailed message as VM not approved on Communication consent       Pharmacy advised by    [x]Fax  []Phone call    Approval letter scanned into [employer] Yes

## 2025-01-17 ENCOUNTER — TELEPHONE (OUTPATIENT)
Age: 20
End: 2025-01-17

## 2025-01-17 NOTE — TELEPHONE ENCOUNTER
Patient is calling regarding cancelling an appointment.    Date/Time: 1/17/25    Reason: No reason given    Patient was rescheduled: YES [x] NO []  If yes, when was Patient reschedule for: 1/27/25    Patient requesting call back to reschedule: YES [] NO [x]

## 2025-01-27 ENCOUNTER — OFFICE VISIT (OUTPATIENT)
Dept: BEHAVIORAL/MENTAL HEALTH CLINIC | Facility: CLINIC | Age: 20
End: 2025-01-27
Payer: COMMERCIAL

## 2025-01-27 DIAGNOSIS — F33.0 MILD RECURRENT MAJOR DEPRESSION (HCC): Primary | ICD-10-CM

## 2025-01-27 DIAGNOSIS — F90.9 ATTENTION DEFICIT HYPERACTIVITY DISORDER (ADHD), UNSPECIFIED ADHD TYPE: ICD-10-CM

## 2025-01-27 PROCEDURE — 90834 PSYTX W PT 45 MINUTES: CPT

## 2025-01-28 NOTE — PSYCH
"Behavioral Health Psychotherapy Progress Note    Psychotherapy Provided: Individual Psychotherapy     1. Mild recurrent major depression (HCC)            Goals addressed in session: Goal 1     DATA: Met with Attila. Session began with open ended questions to gain feedback on mood. He reported that he has been feeling a bit better over the last month. He is happy he is back with his girlfriend. He has decided he does not want to go right into the . He wants to look into Technical school for  work. 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 Attila did focus on his struggles with focus and concentration. He states his thoughts are so rapid that it is hard for him to stay on task and focus. This makes him forgetful. Session focused on interventions that are helpful with racing thoughts. He was encouraged to write things down and create lists for chores. He expressed when he takes his atarax his thoughts lessen and mind clears and this is helpful.   During this session, this clinician used the following therapeutic modalities: Cognitive Behavioral Therapy and Mindfulness-based Strategies    Substance Abuse was not addressed during this session. If the client is diagnosed with a co-occurring substance use disorder, please indicate any changes in the frequency or amount of use: n/a. Stage of change for addressing substance use diagnoses: No substance use/Not applicable    ASSESSMENT:  Dmitriy Peña presents with a Euthymic/ normal mood.     his affect is Normal range and intensity, which is congruent, with his mood and the content of the session. The client has made progress on their goals.     Dmitriy Peña presents with a none risk of suicide, none risk of self-harm, and none risk of harm to others.    For any risk assessment that surpasses a \"low\" rating, a safety plan must be developed.    A safety plan was indicated: no  If yes, describe in detail " n/a    PLAN: Between sessions, Dmitriy Peña will use natural supports and coping skills. At the next session, the therapist will use Client-centered Therapy and Cognitive Behavioral Therapy to address concentration.    Behavioral Health Treatment Plan and Discharge Planning: Dmitriy Peña is aware of and agrees to continue to work on their treatment plan. They have identified and are working toward their discharge goals. yes    Depression Follow-up Plan Completed: Not applicable    Visit start and stop times:    01/27/25  Start Time: 1400  Stop Time: 1442  Total Visit Time: 42 minutes

## 2025-02-18 ENCOUNTER — TELEMEDICINE (OUTPATIENT)
Dept: PSYCHIATRY | Facility: CLINIC | Age: 20
End: 2025-02-18
Payer: COMMERCIAL

## 2025-02-18 DIAGNOSIS — F40.10 SOCIAL ANXIETY DISORDER: ICD-10-CM

## 2025-02-18 DIAGNOSIS — F90.9 ATTENTION DEFICIT HYPERACTIVITY DISORDER (ADHD), UNSPECIFIED ADHD TYPE: ICD-10-CM

## 2025-02-18 DIAGNOSIS — F33.1 MDD (MAJOR DEPRESSIVE DISORDER), RECURRENT EPISODE, MODERATE (HCC): ICD-10-CM

## 2025-02-18 PROCEDURE — 99214 OFFICE O/P EST MOD 30 MIN: CPT | Performed by: STUDENT IN AN ORGANIZED HEALTH CARE EDUCATION/TRAINING PROGRAM

## 2025-02-18 RX ORDER — ATOMOXETINE 25 MG/1
25 CAPSULE ORAL
Qty: 90 CAPSULE | Refills: 1 | Status: SHIPPED | OUTPATIENT
Start: 2025-02-18 | End: 2025-08-17

## 2025-02-18 RX ORDER — HYDROXYZINE HYDROCHLORIDE 25 MG/1
25 TABLET, FILM COATED ORAL EVERY 6 HOURS PRN
Qty: 360 TABLET | Refills: 1 | Status: SHIPPED | OUTPATIENT
Start: 2025-02-18
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
## 2025-02-21 ENCOUNTER — TELEPHONE (OUTPATIENT)
Dept: PSYCHIATRY | Facility: CLINIC | Age: 20
End: 2025-02-21

## 2025-03-10 ENCOUNTER — TELEPHONE (OUTPATIENT)
Age: 20
End: 2025-03-10

## 2025-03-10 NOTE — TELEPHONE ENCOUNTER
Patient is calling regarding cancelling an appointment.    Date/Time: 3/24/25 at 3pm    Reason: no reason provided    Patient was rescheduled: YES [x] NO []  If yes, when was Patient reschedule for: 3/26/25 at 12pm    Patient requesting call back to reschedule: YES [] NO [x]

## 2025-03-26 ENCOUNTER — OFFICE VISIT (OUTPATIENT)
Dept: BEHAVIORAL/MENTAL HEALTH CLINIC | Facility: CLINIC | Age: 20
End: 2025-03-26
Payer: COMMERCIAL

## 2025-03-26 DIAGNOSIS — F90.9 ATTENTION DEFICIT HYPERACTIVITY DISORDER (ADHD), UNSPECIFIED ADHD TYPE: Primary | ICD-10-CM

## 2025-03-26 DIAGNOSIS — F33.0 MILD RECURRENT MAJOR DEPRESSION (HCC): ICD-10-CM

## 2025-03-26 PROCEDURE — 90834 PSYTX W PT 45 MINUTES: CPT

## 2025-03-28 NOTE — PSYCH
"Behavioral Health Psychotherapy Progress Note    Psychotherapy Provided: Individual Psychotherapy     1. Attention deficit hyperactivity disorder (ADHD), unspecified ADHD type        2. Mild recurrent major depression (HCC)            Goals addressed in session: Goal 1     DATA: Met with Attila. He expressed positive spirits. He reports he is feeling a bit stressed due to schooling and being unsure of what he wants to do when he graduates. He is unsure if he wants to go to a vocational school or the . He has talked with recruits, but is unsure what he feels will be best for him. He reports his uncle is raising his rent and he is unsure if he will be able to afford it without a job. He did talk with his girlfriend and her parents about moving there for less rent. Attila states he is conflicted and confused. He is having trouble focusing. He is on track to graduate this year, but is behind in some classes. Session focused on skills for ADHD and feeling overwhelmed with tasks.   During this session, this clinician used the following therapeutic modalities: Cognitive Behavioral Therapy    Substance Abuse was not addressed during this session. If the client is diagnosed with a co-occurring substance use disorder, please indicate any changes in the frequency or amount of use: n/a. Stage of change for addressing substance use diagnoses: No substance use/Not applicable    ASSESSMENT:  Dmitriy Peña presents with a Euthymic/ normal mood.     his affect is Normal range and intensity, which is congruent, with his mood and the content of the session. The client has made progress on their goals.     Dmitriy Peña presents with a none risk of suicide, none risk of self-harm, and none risk of harm to others.    For any risk assessment that surpasses a \"low\" rating, a safety plan must be developed.    A safety plan was indicated: no  If yes, describe in detail n/a    PLAN: Between sessions, Dmitriy Peña will use natural " supports and coping skills. At the next session, the therapist will use Cognitive Behavioral Therapy to address managing emotions and tasks.    Behavioral Health Treatment Plan and Discharge Planning: Dmitriy Peña is aware of and agrees to continue to work on their treatment plan. They have identified and are working toward their discharge goals. yes    Depression Follow-up Plan Completed: Not applicable    Visit start and stop times:    03/26/25  Start Time: 1203  Stop Time: 1248  Total Visit Time: 45 minutes

## 2025-04-04 ENCOUNTER — OFFICE VISIT (OUTPATIENT)
Dept: URGENT CARE | Facility: CLINIC | Age: 20
End: 2025-04-04
Payer: COMMERCIAL

## 2025-04-04 VITALS
SYSTOLIC BLOOD PRESSURE: 132 MMHG | HEIGHT: 65 IN | OXYGEN SATURATION: 98 % | BODY MASS INDEX: 33.32 KG/M2 | WEIGHT: 200 LBS | TEMPERATURE: 98 F | HEART RATE: 67 BPM | RESPIRATION RATE: 18 BRPM | DIASTOLIC BLOOD PRESSURE: 74 MMHG

## 2025-04-04 DIAGNOSIS — J06.9 VIRAL URI: Primary | ICD-10-CM

## 2025-04-04 PROCEDURE — S9083 URGENT CARE CENTER GLOBAL: HCPCS

## 2025-04-04 PROCEDURE — 99213 OFFICE O/P EST LOW 20 MIN: CPT

## 2025-04-04 NOTE — LETTER
April 4, 2025     Patient: Dmitriy Peña   YOB: 2005   Date of Visit: 4/4/2025       To Whom it May Concern:    Dmitriy Peña was seen in my clinic on 4/4/2025. He may return to school on Wednesday 4/9/2025 But may return sooner if symptoms has improved and as long as fever (Fever is >100.4F) free for 24 hours without use of fever reducing medication. If fever is present, must wait an additional 24 hours to be fever free before returning to school/work.      If you have any questions or concerns, please don't hesitate to call.         Sincerely,          FAVIO Carter        CC: No Recipients

## 2025-04-04 NOTE — LETTER
April 4, 2025     Patient: Dmitriy Peña   YOB: 2005   Date of Visit: 4/4/2025       To Whom it May Concern:    Dmitriy Peña was seen in my clinic on 4/4/2025. His symptoms had started on 4/3/2025. He may return to school on Wednesday 4/9/2025 But may return sooner if symptoms has improved and as long as fever (Fever is >100.4F) free for 24 hours without use of fever reducing medication. If fever is present, must wait an additional 24 hours to be fever free before returning to school/work.      If you have any questions or concerns, please don't hesitate to call.         Sincerely,          FAVIO Carter        CC: No Recipients

## 2025-04-04 NOTE — PROGRESS NOTES
[facility]        NAME: Dmitriy Peña is a 19 y.o. male  : 2005    MRN: [id number]  DATE: 2025  TIME: 11:28 AM    Assessment and Plan   Viral URI [J06.9]  1. Viral URI            Patient Instructions   At this time you have been diagnosed with a Viral Infection (COVID, influzena, HMPV, RSV are more well known types of viruses) which your body will fight off in about 7-14 days.  Antibiotics are not indicated for viral infections and taking antibiotics while you have a viral infection will not be of benefit and can actually affect the normal good bacteria that you have in your body.  Viruses are self limiting meaning your body fights it off given sufficient time to do so.  At times, you can have a secondary infection while recovering from the viral infection. If this happens, return to be reseen.   For viral illnesses, the recommendation is for supportive care which would consists of the following:  For decongestion:  2nd Generation antihistamines with a decongestant such as:   Claritin-D (Loratadine with Pseudoephedrine) or  Zyrtec-D (Cetirizine with Pseudoephedrine) or   Allegra-D (Fexofenadine with Pseudoephedrine) or   Nasal corticosteroid: examples are Flonase or Nasacort.  Nasal antihistamine: Astepro  Nasal saline irrigation  Humidified air  Warm moist air such as a hot cup of water in a mug, sit at the dining room table with the mug on the table, put a towel over your head to cover over the mug and breath in the warm steam (don't drink the fluid in case you have mucus that drips in)  Vicks Vapor Rub  Mucinex as an expectorant  For Cough or sore throat:  Salt water gurgle  Teaspoon of Honey up to 3x/day  Chloraseptic spray  Throat lozenges  Over the Counter Tylenol or Ibuprofen  Dextromethorphan 30mg PO every 6-8 hours for cough. max 120 mg in 24 hour period     Also to help boost your immune system while you are ill:  Take Zinc 50 mg every 12 hours for the next week (the dose is  important so do not just take a multivitamin with Zinc or an over-the-counter cold med with Zinc such as Airborne or Zicam, as that will not give you the sufficient dose).    You should also take vitamin D3 5000 i.u.s per day for the next 1 week.  Vitamin-C 1 g twice daily (and again dosages are important, do not think you are getting enough vitamin C just by drinking extra orange juice).      Follow up with Primary Care Provider in 3-5 days if not improving.  Proceed to Emergency Department if symptoms worsen.    If tests have been performed at [facility], our office will contact you with results if changes need to be made to the care plan discussed with you at the visit.  [facility]    Chief Complaint     Chief Complaint   Patient presents with   • Cold Like Symptoms     Headache, sinus pressure, chills, nasal congestion for 1 day         History of Present Illness       Patient reports headache, sinus pressure, chills, nasal congestion, cough starting yesterday morning.  He had taken Advil Cold and Sinus for his symptoms 2 times yesterday.  No medication taken today.  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        Review of Systems   Review of Systems   Constitutional:  Positive for fatigue. Negative for chills and fever.   HENT:  Positive for congestion, postnasal drip and rhinorrhea. Negative for ear pain and sore throat.    Eyes:  Negative for pain and visual disturbance.   Respiratory:  Positive for cough. Negative for shortness of breath.    Cardiovascular:  Negative for chest pain and palpitations.   Gastrointestinal:  Negative for abdominal pain and vomiting.   Genitourinary:  Negative for dysuria and hematuria.   Musculoskeletal:  Negative for arthralgias and back pain.   Skin:  Negative for color change and rash.   Neurological:  Negative for dizziness, seizures, syncope, weakness and light-headedness.   All other systems reviewed  "and are negative.        Current Medications       Current Outpatient Medications:   •  atoMOXetine (STRATTERA) 25 mg capsule, Take 1 capsule (25 mg total) by mouth daily after breakfast, Disp: 90 capsule, Rfl: 1  •  FLUoxetine (PROzac) 20 mg capsule, Take 1 capsule (20 mg total) by mouth daily after breakfast, Disp: 90 capsule, Rfl: 1  •  hydrOXYzine HCL (ATARAX) 25 mg tablet, Take 1 tablet (25 mg total) by mouth every 6 (six) hours as needed for anxiety for up to 720 doses, Disp: 360 tablet, Rfl: 1  •  ibuprofen (MOTRIN) 800 mg tablet, Take 800 mg by mouth every 8 (eight) hours as needed for moderate pain, Disp: , Rfl:   •  chlorhexidine (PERIDEX) 0.12 % solution, Apply 15 mL to the mouth or throat 2 (two) times a day (Patient not taking: Reported on 9/19/2024), Disp: 473 mL, Rfl: 0  •  ondansetron (ZOFRAN) 4 mg tablet, Take 1 tablet (4 mg total) by mouth every 8 (eight) hours as needed for nausea or vomiting (Patient not taking: Reported on 4/4/2025), Disp: 20 tablet, Rfl: 0    Current Allergies     Allergies as of 04/04/2025   • (No Known Allergies)            The following portions of the patient's history were reviewed and updated as appropriate: allergies, current medications, past family history, past medical history, past social history, past surgical history and problem list.     Past Medical History:   Diagnosis Date   • Depression        Past Surgical History:   Procedure Laterality Date   • HERNIA REPAIR     • TOE SURGERY         Family History   Problem Relation Age of Onset   • No Known Problems Mother    • No Known Problems Father    • Cancer Maternal Grandfather          Medications have been verified.        Objective   /74   Pulse 67   Temp 98 °F (36.7 °C) (Temporal)   Resp 18   Ht 5' 5\" (1.651 m)   Wt 90.7 kg (200 lb)   SpO2 98%   BMI 33.28 kg/m²   No LMP for male patient.      Physical Exam     Physical Exam  Vitals and nursing note reviewed.   Constitutional:       Appearance: " Normal appearance.   HENT:      Head: Normocephalic and atraumatic.      Right Ear: Tympanic membrane normal.      Left Ear: Tympanic membrane normal.      Nose: Congestion and rhinorrhea present. Rhinorrhea is clear.      Right Sinus: No maxillary sinus tenderness or frontal sinus tenderness.      Left Sinus: No maxillary sinus tenderness or frontal sinus tenderness.      Mouth/Throat:      Mouth: Mucous membranes are moist.      Pharynx: No oropharyngeal exudate or posterior oropharyngeal erythema.   Eyes:      Pupils: Pupils are equal, round, and reactive to light.   Cardiovascular:      Rate and Rhythm: Normal rate.      Pulses: Normal pulses.   Pulmonary:      Effort: Pulmonary effort is normal.      Breath sounds: Normal breath sounds.   Skin:     General: Skin is warm and dry.      Capillary Refill: Capillary refill takes less than 2 seconds.   Neurological:      General: No focal deficit present.      Mental Status: He is alert and oriented to person, place, and time. Mental status is at baseline.      Sensory: No sensory deficit.      Motor: No weakness.   Psychiatric:         Mood and Affect: Mood normal.         Behavior: Behavior normal.         Thought Content: Thought content normal.

## 2025-04-04 NOTE — PATIENT INSTRUCTIONS
At this time you have been diagnosed with a Viral Infection (COVID, influzena, HMPV, RSV are more well known types of viruses) which your body will fight off in about 7-14 days.  Antibiotics are not indicated for viral infections and taking antibiotics while you have a viral infection will not be of benefit and can actually affect the normal good bacteria that you have in your body.  Viruses are self limiting meaning your body fights it off given sufficient time to do so.  At times, you can have a secondary infection while recovering from the viral infection. If this happens, return to be reseen.   For viral illnesses, the recommendation is for supportive care which would consists of the following:  For decongestion:  2nd Generation antihistamines with a decongestant such as:   Claritin-D (Loratadine with Pseudoephedrine) or  Zyrtec-D (Cetirizine with Pseudoephedrine) or   Allegra-D (Fexofenadine with Pseudoephedrine) or   Nasal corticosteroid: examples are Flonase or Nasacort.  Nasal antihistamine: Astepro  Nasal saline irrigation  Humidified air  Warm moist air such as a hot cup of water in a mug, sit at the dining room table with the mug on the table, put a towel over your head to cover over the mug and breath in the warm steam (don't drink the fluid in case you have mucus that drips in)  Vicks Vapor Rub  Mucinex as an expectorant  For Cough or sore throat:  Salt water gurgle  Teaspoon of Honey up to 3x/day  Chloraseptic spray  Throat lozenges  Over the Counter Tylenol or Ibuprofen  Dextromethorphan 30mg PO every 6-8 hours for cough. max 120 mg in 24 hour period     Also to help boost your immune system while you are ill:  Take Zinc 50 mg every 12 hours for the next week (the dose is important so do not just take a multivitamin with Zinc or an over-the-counter cold med with Zinc such as Airborne or Zicam, as that will not give you the sufficient dose).    You should also take vitamin D3 5000 i.u.s per day for the  next 1 week.  Vitamin-C 1 g twice daily (and again dosages are important, do not think you are getting enough vitamin C just by drinking extra orange juice).      Follow up with Primary Care Provider in 3-5 days if not improving.  Proceed to Emergency Department if symptoms worsen.    If tests have been performed at [facility], our office will contact you with results if changes need to be made to the care plan discussed with you at the visit.  You can review your full results on [facility] MyChart.

## 2025-04-22 ENCOUNTER — TELEPHONE (OUTPATIENT)
Age: 20
End: 2025-04-22

## 2025-04-22 NOTE — TELEPHONE ENCOUNTER
Patient is calling regarding cancelling an appointment.    Date/Time: 4/22 @3    Reason: Ride bailed    Patient was rescheduled: YES [x] NO []  If yes, when was Patient reschedule for: 5/9 @2    Patient requesting call back to reschedule: YES [] NO [x]

## 2025-05-09 ENCOUNTER — TELEPHONE (OUTPATIENT)
Age: 20
End: 2025-05-09

## 2025-05-09 NOTE — TELEPHONE ENCOUNTER
Patient is calling regarding cancelling an appointment.    Date/Time: 5.9.25 @ 2:00pm     Reason: None given    Patient was rescheduled: YES [x] NO []  If yes, when was Patient reschedule for: 5.15.25 @ 8:00am    The provider has been made aware via secure chat, and the clerical department has been updated.

## 2025-05-14 ENCOUNTER — TELEPHONE (OUTPATIENT)
Age: 20
End: 2025-05-14

## 2025-05-14 NOTE — TELEPHONE ENCOUNTER
Pt called in seeking to switch his appt to virtual. Pt didn't realize it was not the day for his appt. Pt will keep his appt for 5/15/2025

## 2025-05-15 ENCOUNTER — TELEPHONE (OUTPATIENT)
Age: 20
End: 2025-05-15

## 2025-05-15 ENCOUNTER — TELEMEDICINE (OUTPATIENT)
Dept: BEHAVIORAL/MENTAL HEALTH CLINIC | Facility: CLINIC | Age: 20
End: 2025-05-15
Payer: COMMERCIAL

## 2025-05-15 DIAGNOSIS — F90.9 ATTENTION DEFICIT HYPERACTIVITY DISORDER (ADHD), UNSPECIFIED ADHD TYPE: Primary | ICD-10-CM

## 2025-05-15 DIAGNOSIS — F33.0 MILD RECURRENT MAJOR DEPRESSION (HCC): ICD-10-CM

## 2025-05-15 PROCEDURE — 90834 PSYTX W PT 45 MINUTES: CPT

## 2025-05-15 NOTE — TELEPHONE ENCOUNTER
Pt called inn seeking to switch his appt to virtual due to ride complications. Writer asked pt to go into his my chart and sign the virtual care behavorial health consent. Pt stated the appt updated and he was no longer able to get into it. Writer provided the my chart help line for the patient to get assistance in his chart. Without he consent signed , pt can reschedule or cancel his appt if he calls back.

## 2025-05-19 NOTE — PSYCH
"Virtual Regular VisitName: Dmitriy Peña      : 2005      MRN: [id number]  Encounter Provider: Sandi Winston  Encounter Date: 5/15/2025   Encounter department: [facility] THERAP[facility]ANCIS  :  Assessment & Plan  Attention deficit hyperactivity disorder (ADHD), unspecified ADHD type         Mild recurrent major depression (HCC)             Goals addressed in session: Goal 1     DATA: Met with Attila. He expressed feeling anxious due to his upcoming high school graduation. He expressed it is scary to be \"a real adult.\" He did discuss moving in with his girlfriend to assist him financially. His relationship is improving slightly with his uncle that he currently lives with; however, he expressed his uncle was told he has a brain aneurysm. He processed his worry about this and questions if this is why his uncle has been being nicer to him. Discussed relationship building and emotion regulation.   During this session, this clinician used the following therapeutic modalities: Cognitive Behavioral Therapy    Substance Abuse was not addressed during this session. If the client is diagnosed with a co-occurring substance use disorder, please indicate any changes in the frequency or amount of use: n/a. Stage of change for addressing substance use diagnoses: No substance use/Not applicable    ASSESSMENT:  Dmitriy presents with a Euthymic/ normal mood. Sashas affect is Normal range and intensity, which is congruent, with their mood and the content of the session. The client has made progress on their goals as evidenced by better communication.    Dmitriy presents with a none risk of suicide, none risk of self-harm, and none risk of harm to others.    For any risk assessment that surpasses a \"low\" rating, a safety plan must be developed.    A safety plan was indicated: no  If yes, describe in detail n/a    PLAN: Between sessions, Dmitriy will use natural supports and coping skills. At the " next session, the therapist will use Cognitive Behavioral Therapy to address emotion regulatoin.    Behavioral Health Treatment Plan [facility]: Diagnosis and Treatment Plan explained to Dmitriy, Dmitriy relates understanding diagnosis and is agreeable to Treatment Plan. Yes     Depression Follow-up Plan Completed: Not applicable     Reason for visit is No chief complaint on file.     Recent Visits  Date Type Provider Dept   05/15/25 Telemedicine Sandi Winston [facility] Therapist Chris   Showing recent visits within past 7 days and meeting all other requirements  Future Appointments  No visits were found meeting these conditions.  Showing future appointments within next 150 days and meeting all other requirements     History of Present Illness     HPI    Past Medical History   Past Medical History:   Diagnosis Date    Depression      Past Surgical History:   Procedure Laterality Date    HERNIA REPAIR      TOE SURGERY       Current Outpatient Medications   Medication Instructions    atoMOXetine (STRATTERA) 25 mg, Oral, Daily after breakfast    chlorhexidine (PERIDEX) 0.12 % solution 15 mL, Mouth/Throat, 2 times daily    FLUoxetine (PROZAC) 20 mg, Oral, Daily after breakfast    hydrOXYzine HCL (ATARAX) 25 mg, Oral, Every 6 hours PRN    ibuprofen (MOTRIN) 800 mg, Every 8 hours PRN    ondansetron (ZOFRAN) 4 mg, Oral, Every 8 hours PRN     Allergies[1]    Objective   There were no vitals taken for this visit.    Video Exam  Physical Exam     Administrative Statements   Encounter provider Sandi Winston    The Patient is located at Home and in the following state in which I hold an active license [place].    The patient was identified by name and date of birth. Dmitriy Peña was informed that this is a telemedicine visit and that the visit is being conducted through the Epic Embedded platform. He agrees to proceed..  My office door was closed. No one else was in the room.  He acknowledged consent and  understanding of privacy and security of the video platform. The patient has agreed to participate and understands they can discontinue the visit at any time.        Visit Time  Start Time: 0800  Stop Time: 0840  Total Visit Time: 40 minutes         [1] No Known Allergies

## 2025-05-20 ENCOUNTER — TELEPHONE (OUTPATIENT)
Dept: PSYCHIATRY | Facility: CLINIC | Age: 20
End: 2025-05-20

## 2025-05-20 ENCOUNTER — TELEPHONE (OUTPATIENT)
Age: 20
End: 2025-05-20

## 2025-05-20 NOTE — TELEPHONE ENCOUNTER
Left voicemail informing patient and/or parent/guardian of the Psych Encounter form needing to be signed as a requirement from the insurance company for billing purposes. Patient can access form via [employer] and sign electronically.     Please make patient aware this form must be signed for each visit as a requirement to continue future visits with provider.    Virtual Encounter form 5/15/25 call #1

## 2025-05-21 ENCOUNTER — TELEPHONE (OUTPATIENT)
Age: 20
End: 2025-05-21

## 2025-05-21 NOTE — TELEPHONE ENCOUNTER
Patient is calling regarding cancelling an appointment.    Date/Time: 6/4/25 at 3pm    Reason: patient cannot make appt    Patient was rescheduled: YES [x] NO []  If yes, when was Patient reschedule for: 6/5/25 at 2pm    Patient requesting call back to reschedule: YES [] NO [x]

## 2025-05-27 ENCOUNTER — OFFICE VISIT (OUTPATIENT)
Dept: URGENT CARE | Facility: CLINIC | Age: 20
End: 2025-05-27
Payer: COMMERCIAL

## 2025-05-27 VITALS
HEART RATE: 57 BPM | RESPIRATION RATE: 16 BRPM | SYSTOLIC BLOOD PRESSURE: 135 MMHG | TEMPERATURE: 97.7 F | OXYGEN SATURATION: 99 % | DIASTOLIC BLOOD PRESSURE: 73 MMHG

## 2025-05-27 DIAGNOSIS — K08.89 PAIN, DENTAL: Primary | ICD-10-CM

## 2025-05-27 PROCEDURE — 99213 OFFICE O/P EST LOW 20 MIN: CPT | Performed by: PHYSICAL MEDICINE & REHABILITATION

## 2025-05-27 PROCEDURE — S9083 URGENT CARE CENTER GLOBAL: HCPCS | Performed by: PHYSICAL MEDICINE & REHABILITATION

## 2025-05-27 NOTE — PROGRESS NOTES
[facility]        NAME: Dmitriy Peña is a 19 y.o. male  : 2005    MRN: [id number]  DATE: May 27, 2025  TIME: 11:51 AM    Assessment and Plan   Pain, dental [K08.89]  1. Pain, dental  amoxicillin-clavulanate (AUGMENTIN) 875-125 mg per tablet            Patient Instructions       Follow up with PCP in 3-5 days.  Proceed to  ER if symptoms worsen.    If tests are performed, our office will contact you with results only if changes need to made to the care plan discussed with you at the visit. [facility]    Chief Complaint     Chief Complaint   Patient presents with    Dental Problem     Patient had part of root canal tooth came out today from upper left side of mouth. Called dentist and was told to come to urgent care. States yesterday part of the tooth chipped.         History of Present Illness       Pt is a 19  year old male presenting with left upper dental pain He admits to recent partial root canal and the tooth came out today. He did call his dentist who advised the patient to come here.         Review of Systems   Review of Systems   Constitutional: Negative.    HENT:  Positive for dental problem.    Respiratory: Negative.     Cardiovascular: Negative.          Current Medications     Current Medications[1]    Current Allergies     Allergies as of 2025    (No Known Allergies)            The following portions of the patient's history were reviewed and updated as appropriate: allergies, current medications, past family history, past medical history, past social history, past surgical history and problem list.     Past Medical History[2]    Past Surgical History[3]    Family History[4]      Medications have been verified.        Objective   /73   Pulse 57   Temp 97.7 °F (36.5 °C)   Resp 16   SpO2 99%        Physical Exam     Physical Exam  Vitals reviewed.   Constitutional:       General: He is not in acute distress.  HENT:       Mouth/Throat:      Mouth: Mucous membranes are moist.      Dentition: Abnormal dentition. Dental tenderness and gingival swelling present.      Pharynx: Oropharynx is clear.     Cardiovascular:      Rate and Rhythm: Normal rate and regular rhythm.      Pulses: Normal pulses.      Heart sounds: Normal heart sounds.   Pulmonary:      Effort: Pulmonary effort is normal.      Breath sounds: Normal breath sounds.     Neurological:      Mental Status: He is alert.                        [1]   Current Outpatient Medications:     amoxicillin-clavulanate (AUGMENTIN) 875-125 mg per tablet, Take 1 tablet by mouth every 12 (twelve) hours for 7 days, Disp: 14 tablet, Rfl: 0    atoMOXetine (STRATTERA) 25 mg capsule, Take 1 capsule (25 mg total) by mouth daily after breakfast, Disp: 90 capsule, Rfl: 1    FLUoxetine (PROzac) 20 mg capsule, Take 1 capsule (20 mg total) by mouth daily after breakfast, Disp: 90 capsule, Rfl: 1    hydrOXYzine HCL (ATARAX) 25 mg tablet, Take 1 tablet (25 mg total) by mouth every 6 (six) hours as needed for anxiety for up to 720 doses, Disp: 360 tablet, Rfl: 1    chlorhexidine (PERIDEX) 0.12 % solution, Apply 15 mL to the mouth or throat 2 (two) times a day (Patient not taking: Reported on 5/27/2025), Disp: 473 mL, Rfl: 0    ibuprofen (MOTRIN) 800 mg tablet, Take 800 mg by mouth every 8 (eight) hours as needed for moderate pain (Patient not taking: Reported on 5/27/2025), Disp: , Rfl:     ondansetron (ZOFRAN) 4 mg tablet, Take 1 tablet (4 mg total) by mouth every 8 (eight) hours as needed for nausea or vomiting (Patient not taking: Reported on 5/27/2025), Disp: 20 tablet, Rfl: 0  [2]   Past Medical History:  Diagnosis Date    Depression    [3]   Past Surgical History:  Procedure Laterality Date    HERNIA REPAIR      TOE SURGERY     [4]   Family History  Problem Relation Name Age of Onset    No Known Problems Mother      No Known Problems Father      Cancer Maternal Grandfather

## 2025-05-27 NOTE — LETTER
May 27, 2025     Patient: Dmitriy Peña   YOB: 2005   Date of Visit: 5/27/2025       To Whom it May Concern:    Dmitriy Peña was seen in my clinic on 5/27/2025. He may return to school on 5/28/25.    If you have any questions or concerns, please don't hesitate to call.         Sincerely,          Maria Antonia Foster PA-C        CC: No Recipients

## 2025-06-03 ENCOUNTER — OFFICE VISIT (OUTPATIENT)
Dept: FAMILY MEDICINE CLINIC | Facility: CLINIC | Age: 20
End: 2025-06-03
Payer: COMMERCIAL

## 2025-06-03 VITALS
BODY MASS INDEX: 36.49 KG/M2 | WEIGHT: 219 LBS | DIASTOLIC BLOOD PRESSURE: 88 MMHG | HEIGHT: 65 IN | SYSTOLIC BLOOD PRESSURE: 128 MMHG | TEMPERATURE: 98.2 F | HEART RATE: 78 BPM | OXYGEN SATURATION: 98 %

## 2025-06-03 DIAGNOSIS — Z87.19 S/P HERNIA REPAIR: ICD-10-CM

## 2025-06-03 DIAGNOSIS — Z13.1 SCREENING FOR DIABETES MELLITUS (DM): ICD-10-CM

## 2025-06-03 DIAGNOSIS — Z87.820 HISTORY OF CONCUSSION: ICD-10-CM

## 2025-06-03 DIAGNOSIS — F33.0 MILD RECURRENT MAJOR DEPRESSION (HCC): ICD-10-CM

## 2025-06-03 DIAGNOSIS — Z98.890 S/P HERNIA REPAIR: ICD-10-CM

## 2025-06-03 DIAGNOSIS — F90.8 OTHER SPECIFIED ATTENTION DEFICIT HYPERACTIVITY DISORDER (ADHD): ICD-10-CM

## 2025-06-03 DIAGNOSIS — K08.409 LOSS OF TOOTH: ICD-10-CM

## 2025-06-03 DIAGNOSIS — Z13.220 SCREENING FOR LIPID DISORDERS: ICD-10-CM

## 2025-06-03 DIAGNOSIS — R19.4 CHANGE IN BOWEL HABITS: ICD-10-CM

## 2025-06-03 DIAGNOSIS — F40.10 SOCIAL ANXIETY DISORDER: ICD-10-CM

## 2025-06-03 DIAGNOSIS — Z00.00 ANNUAL PHYSICAL EXAM: Primary | ICD-10-CM

## 2025-06-03 DIAGNOSIS — Z00.00 ROUTINE HEALTH MAINTENANCE: ICD-10-CM

## 2025-06-03 PROCEDURE — 99204 OFFICE O/P NEW MOD 45 MIN: CPT

## 2025-06-03 PROCEDURE — 99395 PREV VISIT EST AGE 18-39: CPT

## 2025-06-03 RX ORDER — ESCITALOPRAM OXALATE 10 MG/1
10 TABLET ORAL DAILY
Qty: 45 TABLET | Refills: 0 | Status: SHIPPED | OUTPATIENT
Start: 2025-06-03

## 2025-06-03 RX ORDER — HYDROXYZINE HYDROCHLORIDE 25 MG/1
25 TABLET, FILM COATED ORAL EVERY 6 HOURS PRN
Qty: 360 TABLET | Refills: 1 | Status: SHIPPED | OUTPATIENT
Start: 2025-06-03

## 2025-06-03 NOTE — PROGRESS NOTES
"Adult Annual Physical  Name: Dmitriy Peña      : 2005      MRN: [id number]  Encounter Provider: Jong Espinoza PA-C  Encounter Date: 6/3/2025   Encounter department: [facility]    :  Assessment & Plan  Annual physical exam         Mild recurrent major depression (HCC)  Depression Screening Follow-up Plan: Patient's depression screening was positive with a PHQ-9 score of 9. Patient assessed for underlying major depression. They have no active suicidal ideations. Brief counseling provided and recommend additional follow-up/re-evaluation next office visit.  Chronic condition currently exacerbated as pt is not taking current Rx  Is not taking prozac as he feels like a \"zombie\", has not taken for greater than 2 weeks  After discussing risk benefits we will switch to Lexapro and continue hydroxyzine, patient will be following up  with psychiatric provider I recommended that they further discuss adverse effect to atomoxetine and consideration for additional medications in regards to ADHD diagnoses  Denies si/hi  F/u 5 weeks sooner prn   Orders:    escitalopram (Lexapro) 10 mg tablet; Take 1 tablet (10 mg total) by mouth daily    hydrOXYzine HCL (ATARAX) 25 mg tablet; Take 1 tablet (25 mg total) by mouth every 6 (six) hours as needed for anxiety for up to 720 doses    History of concussion  Pt recounts a hx of head trauma in his early teens when he collided during sports with another   He also hit his head on a pipe 3 years ago  No recent trauma, family notes some attention difficulty but patient also has history of ADHD  Neurologic exam unremarkable  Low suspicion for lasting deficits status post concussion, will will optimize mental health medications and reassess       Other specified attention deficit hyperactivity disorder (ADHD)  Poorly controlled with Strattera use, patient did not notice a positive change so he stopped taking this medication  Endorses symptoms of decreased " concentration and taking longer to complete task  He does follow with psych and has an upcoming follow-up 6/5, recommended continued follow-up and discussion for stimulant medication       Change in bowel habits  Diarrhea intermittently, pt has recently moved in with significant other and her mother and notes that he has intermittent diarrhea which is nonbloody  He has had a positive change in his diet that may be contributory  Examination unremarkable, there is no abdominal tenderness  Continue to monitor will consider GI referral or initiation of medication if continued       Loss of tooth  Patient had dental implant in place of the left upper molar, it has since fallen out and there is surrounding evidence of dental carry, he was recently placed on Augmentin course for this but is seeking maxillofacial referral for a dental provider that takes his insurance  Referral placed, continue antibiotic  Orders:    Ambulatory Referral to Oral Maxillofacial Surgery; Future    S/P hernia repair         Routine health maintenance    Orders:    CBC and differential; Future    Comprehensive metabolic panel; Future    Screening for lipid disorders    Orders:    Lipid panel; Future    Screening for diabetes mellitus (DM)    Orders:    Hemoglobin A1C; Future    Social anxiety disorder    Orders:    hydrOXYzine HCL (ATARAX) 25 mg tablet; Take 1 tablet (25 mg total) by mouth every 6 (six) hours as needed for anxiety for up to 720 doses        Preventive Screenings:    - Prostate cancer screening: screening not indicated     Immunizations:  - Immunizations due: HPV (Gardasil 9)         History of Present Illness     Adult Annual Physical:  Patient presents for annual physical. Dmitriy Peña is a 19 y.o. male  presenting for mental health concerns, to establish care, and is also due for annual    care gaps, HCC, screenings, routine labs, reviewed at this visit    **Note: Portions of the record may have been created with voice  "recognition software.  Occasional wrong word or \"sound alike\" substitutions may have occurred due to the inherent limitations of voice recognition software.  Please read the chart carefully and recognize, using context, where substitutions have occurred. Please contact for further clarification, when necessary. .     Diet and Physical Activity:  - Diet/Nutrition: no special diet.  - Exercise: no formal exercise.    Depression Screening:    - PHQ-9 Score: 9    General Health:  - Sleep: > 8 hours of sleep on average.  - Hearing: normal hearing bilateral ears.  - Vision: most recent eye exam > 1 year ago.  - Dental: regular dental visits.    Review of Systems   Constitutional:  Negative for chills and fever.   HENT:  Negative for ear pain and sore throat.    Eyes:  Negative for pain and visual disturbance.   Respiratory:  Negative for cough and shortness of breath.    Cardiovascular:  Negative for chest pain and palpitations.   Gastrointestinal:  Positive for diarrhea. Negative for abdominal pain and vomiting.   Genitourinary:  Negative for dysuria and hematuria.   Musculoskeletal:  Negative for arthralgias and back pain.   Skin:  Negative for color change and rash.   Neurological:  Negative for seizures and syncope.   Psychiatric/Behavioral:  Positive for decreased concentration and dysphoric mood.    All other systems reviewed and are negative.    Medical History Reviewed by provider this encounter:  Tobacco  Allergies  Meds  Problems  Med Hx  Surg Hx  Fam Hx     .  Past Medical History   Past Medical History[1]  Past Surgical History[2]  Family History[3]   reports that he has never smoked. He has never used smokeless tobacco. He reports that he does not drink alcohol and does not use drugs.  Current Outpatient Medications   Medication Instructions    amoxicillin-clavulanate (AUGMENTIN) 875-125 mg per tablet 1 tablet, Oral, Every 12 hours scheduled    atoMOXetine (STRATTERA) 25 mg, Oral, Daily after breakfast "    chlorhexidine (PERIDEX) 0.12 % solution 15 mL, Mouth/Throat, 2 times daily    escitalopram (LEXAPRO) 10 mg, Oral, Daily    hydrOXYzine HCL (ATARAX) 25 mg, Oral, Every 6 hours PRN    ibuprofen (MOTRIN) 800 mg, Every 8 hours PRN    ondansetron (ZOFRAN) 4 mg, Oral, Every 8 hours PRN   Allergies[4]   Medications Ordered Prior to Encounter[5]   Social History[6]    Objective   There were no vitals taken for this visit.    Physical Exam  Vitals and nursing note reviewed.   Constitutional:       General: He is not in acute distress.     Appearance: He is well-developed.   HENT:      Head: Normocephalic and atraumatic.      Right Ear: Tympanic membrane normal.      Left Ear: Tympanic membrane normal.      Nose: Nose normal.      Mouth/Throat:      Pharynx: Oropharynx is clear.     Eyes:      Conjunctiva/sclera: Conjunctivae normal.       Cardiovascular:      Rate and Rhythm: Normal rate and regular rhythm.      Heart sounds: Normal heart sounds. No murmur heard.  Pulmonary:      Effort: Pulmonary effort is normal. No respiratory distress.      Breath sounds: Normal breath sounds. No wheezing.   Abdominal:      General: Abdomen is flat. There is no distension.      Palpations: There is no mass.      Tenderness: There is no abdominal tenderness. There is no guarding or rebound.     Musculoskeletal:         General: No swelling.      Cervical back: Neck supple.     Skin:     General: Skin is warm and dry.     Neurological:      General: No focal deficit present.      Mental Status: He is alert and oriented to person, place, and time.      Cranial Nerves: No cranial nerve deficit.      Sensory: No sensory deficit.      Coordination: Coordination normal.      Gait: Gait normal.     Psychiatric:         Mood and Affect: Mood normal.                [1]   Past Medical History:  Diagnosis Date    Depression    [2]   Past Surgical History:  Procedure Laterality Date    HERNIA REPAIR      TOE SURGERY     [3]   Family  History  Problem Relation Name Age of Onset    No Known Problems Mother      No Known Problems Father      Cancer Maternal Grandfather     [4]   Allergies  Allergen Reactions    Pollen Extract Allergic Rhinitis   [5]   Current Outpatient Medications on File Prior to Visit   Medication Sig Dispense Refill    amoxicillin-clavulanate (AUGMENTIN) 875-125 mg per tablet Take 1 tablet by mouth every 12 (twelve) hours for 7 days 14 tablet 0    atoMOXetine (STRATTERA) 25 mg capsule Take 1 capsule (25 mg total) by mouth daily after breakfast (Patient not taking: Reported on 6/3/2025) 90 capsule 1    chlorhexidine (PERIDEX) 0.12 % solution Apply 15 mL to the mouth or throat 2 (two) times a day (Patient not taking: Reported on 5/27/2025) 473 mL 0    ibuprofen (MOTRIN) 800 mg tablet Take 800 mg by mouth every 8 (eight) hours as needed for moderate pain (Patient not taking: Reported on 5/27/2025)      ondansetron (ZOFRAN) 4 mg tablet Take 1 tablet (4 mg total) by mouth every 8 (eight) hours as needed for nausea or vomiting (Patient not taking: Reported on 5/27/2025) 20 tablet 0    [DISCONTINUED] FLUoxetine (PROzac) 20 mg capsule Take 1 capsule (20 mg total) by mouth daily after breakfast (Patient not taking: Reported on 6/3/2025) 90 capsule 1    [DISCONTINUED] hydrOXYzine HCL (ATARAX) 25 mg tablet Take 1 tablet (25 mg total) by mouth every 6 (six) hours as needed for anxiety for up to 720 doses (Patient not taking: Reported on 6/3/2025) 360 tablet 1     No current facility-administered medications on file prior to visit.   [6]   Social History  Tobacco Use    Smoking status: Never    Smokeless tobacco: Never   Vaping Use    Vaping status: Never Used   Substance and Sexual Activity    Alcohol use: Never    Drug use: Never    Sexual activity: Not Currently

## 2025-06-03 NOTE — ASSESSMENT & PLAN NOTE
"Depression Screening Follow-up Plan: Patient's depression screening was positive with a PHQ-9 score of 9. Patient assessed for underlying major depression. They have no active suicidal ideations. Brief counseling provided and recommend additional follow-up/re-evaluation next office visit.  Chronic condition currently exacerbated as pt is not taking current Rx  Is not taking prozac as he feels like a \"zombie\", has not taken for greater than 2 weeks  After discussing risk benefits we will switch to Lexapro and continue hydroxyzine, patient will be following up 6/5 with psychiatric provider I recommended that they further discuss adverse effect to atomoxetine and consideration for additional medications in regards to ADHD diagnoses  Denies si/hi  F/u 5 weeks sooner prn   Orders:    escitalopram (Lexapro) 10 mg tablet; Take 1 tablet (10 mg total) by mouth daily    hydrOXYzine HCL (ATARAX) 25 mg tablet; Take 1 tablet (25 mg total) by mouth every 6 (six) hours as needed for anxiety for up to 720 doses    "

## 2025-06-03 NOTE — ASSESSMENT & PLAN NOTE
Poorly controlled with Strattera use, patient did not notice a positive change so he stopped taking this medication  Endorses symptoms of decreased concentration and taking longer to complete task  He does follow with psych and has an upcoming follow-up 6/5, recommended continued follow-up and discussion for stimulant medication

## 2025-06-03 NOTE — PATIENT INSTRUCTIONS
"Patient Education     Routine physical for adults   The Basics   Written by the doctors and editors at [facility]   What is a physical? -- A physical is a routine visit, or \"check-up,\" with your doctor. You might also hear it called a \"wellness visit\" or \"preventive visit.\"  During each visit, the doctor will:   Ask about your physical and mental health   Ask about your habits, behaviors, and lifestyle   Do an exam   Give you vaccines if needed   Talk to you about any medicines you take   Give advice about your health   Answer your questions  Getting regular check-ups is an important part of taking care of your health. It can help your doctor find and treat any problems you have. But it's also important for preventing health problems.  A routine physical is different from a \"sick visit.\" A sick visit is when you see a doctor because of a health concern or problem. Since physicals are scheduled ahead of time, you can think about what you want to ask the doctor.  How often should I get a physical? -- It depends on your age and health. In general, for people age 21 years and older:   If you are younger than 50 years, you might be able to get a physical every 3 years.   If you are 50 years or older, your doctor might recommend a physical every year.  If you have an ongoing health condition, like diabetes or high blood pressure, your doctor will probably want to see you more often.  What happens during a physical? -- In general, each visit will include:   Physical exam - The doctor or nurse will check your height, weight, heart rate, and blood pressure. They will also look at your eyes and ears. They will ask about how you are feeling and whether you have any symptoms that bother you.   Medicines - It's a good idea to bring a list of all the medicines you take to each doctor visit. Your doctor will talk to you about your medicines and answer any questions. Tell them if you are having any side effects that bother you. You " "should also tell them if you are having trouble paying for any of your medicines.   Habits and behaviors - This includes:   Your diet   Your exercise habits   Whether you smoke, drink alcohol, or use drugs   Whether you are sexually active   Whether you feel safe at home  Your doctor will talk to you about things you can do to improve your health and lower your risk of health problems. They will also offer help and support. For example, if you want to quit smoking, they can give you advice and might prescribe medicines. If you want to improve your diet or get more physical activity, they can help you with this, too.   Lab tests, if needed - The tests you get will depend on your age and situation. For example, your doctor might want to check your:   Cholesterol   Blood sugar   Iron level   Vaccines - The recommended vaccines will depend on your age, health, and what vaccines you already had. Vaccines are very important because they can prevent certain serious or deadly infections.   Discussion of screening - \"Screening\" means checking for diseases or other health problems before they cause symptoms. Your doctor can recommend screening based on your age, risk, and preferences. This might include tests to check for:   Cancer, such as breast, prostate, cervical, ovarian, colorectal, prostate, lung, or skin cancer   Sexually transmitted infections, such as chlamydia and gonorrhea   Mental health conditions like depression and anxiety  Your doctor will talk to you about the different types of screening tests. They can help you decide which screenings to have. They can also explain what the results might mean.   Answering questions - The physical is a good time to ask the doctor or nurse questions about your health. If needed, they can refer you to other doctors or specialists, too.  Adults older than 65 years often need other care, too. As you get older, your doctor will talk to you about:   How to prevent falling at " home   Hearing or vision tests   Memory testing   How to take your medicines safely   Making sure that you have the help and support you need at home  All topics are updated as new evidence becomes available and our peer review process is complete.  This topic retrieved from [employer] on: May 02, 2024.  Topic 377030 Version 1.0  Release: 32.4.3 - C32.122  © 2024 UpToDate, Inc. and/or its affiliates. All rights reserved.  Consumer Information Use and Disclaimer   Disclaimer: This generalized information is a limited summary of diagnosis, treatment, and/or medication information. It is not meant to be comprehensive and should be used as a tool to help the user understand and/or assess potential diagnostic and treatment options. It does NOT include all information about conditions, treatments, medications, side effects, or risks that may apply to a specific patient. It is not intended to be medical advice or a substitute for the medical advice, diagnosis, or treatment of a health care provider based on the health care provider's examination and assessment of a patient's specific and unique circumstances. Patients must speak with a health care provider for complete information about their health, medical questions, and treatment options, including any risks or benefits regarding use of medications. This information does not endorse any treatments or medications as safe, effective, or approved for treating a specific patient. UpToDate, Inc. and its affiliates disclaim any warranty or liability relating to this information or the use thereof.The use of this information is governed by the Terms of Use, available at https://www.woltersStARTinitiativeuwer.com/en/know/clinical-effectiveness-terms. 2024© UpToDate, Inc. and its affiliates and/or licensors. All rights reserved.  Copyright   © 2024 UpToDate, Inc. and/or its affiliates. All rights reserved.

## 2025-06-05 ENCOUNTER — TELEMEDICINE (OUTPATIENT)
Dept: BEHAVIORAL/MENTAL HEALTH CLINIC | Facility: CLINIC | Age: 20
End: 2025-06-05
Payer: COMMERCIAL

## 2025-06-05 DIAGNOSIS — F33.0 MILD RECURRENT MAJOR DEPRESSION (HCC): Primary | ICD-10-CM

## 2025-06-05 PROCEDURE — 90832 PSYTX W PT 30 MINUTES: CPT

## 2025-06-10 ENCOUNTER — TELEPHONE (OUTPATIENT)
Dept: PSYCHIATRY | Facility: CLINIC | Age: 20
End: 2025-06-10

## 2025-06-10 NOTE — PSYCH
"Virtual Regular VisitName: Dmitriy Peña      : 2005      MRN: [id number]  Encounter Provider: Sandi Winston  Encounter Date: 2025   Encounter department: [facility]  :  Assessment & Plan  Mild recurrent major depression (HCC)             Goals addressed in session: Goal 1     DATA: Met with Attila. He expressed positive spirits. He moved in with his girlfriend and her parents. He reports this has been a positive move for him. He will be graduating high school tomorrow. He processed some nerves related to \"being an adult.\" He is hopeful to start working soon. Session focused on termination of services with this clinician due to transferring offices. He will transfer to Mark Carr LCSW and is in agreement.   During this session, this clinician used the following therapeutic modalities: Client-centered Therapy and Supportive Psychotherapy    Substance Abuse was not addressed during this session. If the client is diagnosed with a co-occurring substance use disorder, please indicate any changes in the frequency or amount of use: n/a. Stage of change for addressing substance use diagnoses: No substance use/Not applicable    ASSESSMENT:  Dmitriy presents with a Euthymic/ normal mood. Sashas affect is Normal range and intensity, which is congruent, with their mood and the content of the session. The client has made progress on their goals as evidenced by emotion regulation.    Dmitriy presents with a none risk of suicide, none risk of self-harm, and none risk of harm to others.    For any risk assessment that surpasses a \"low\" rating, a safety plan must be developed.    A safety plan was indicated: no  If yes, describe in detail n/a    PLAN: Between sessions, Dmitriy will transition to Mark Carr LCSW .     Behavioral Health Treatment Plan [facility]: Diagnosis and Treatment Plan explained to Dmitriy Izaguirre relates understanding diagnosis and is " agreeable to Treatment Plan. Yes     Depression Follow-up Plan Completed: Not applicable     Reason for visit is No chief complaint on file.     Recent Visits  Date Type Provider Dept   06/05/25 Telemedicine [facility]   06/03/25 Office Visit Jong Espinoza PA-C [facility]   Showing recent visits within past 7 days and meeting all other requirements  Future Appointments  No visits were found meeting these conditions.  Showing future appointments within next 150 days and meeting all other requirements     History of Present Illness     HPI    Past Medical History   Past Medical History[1]  Past Surgical History[2]  Current Outpatient Medications   Medication Instructions    atoMOXetine (STRATTERA) 25 mg, Oral, Daily after breakfast    chlorhexidine (PERIDEX) 0.12 % solution 15 mL, Mouth/Throat, 2 times daily    escitalopram (LEXAPRO) 10 mg, Oral, Daily    hydrOXYzine HCL (ATARAX) 25 mg, Oral, Every 6 hours PRN    ibuprofen (MOTRIN) 800 mg, Every 8 hours PRN    ondansetron (ZOFRAN) 4 mg, Oral, Every 8 hours PRN     Allergies[3]    Objective   There were no vitals taken for this visit.    Video Exam  Physical Exam     Administrative Statements   Encounter provider Sandi Winston    The Patient is located at Home and in the following state in which I hold an active license [place].    The patient was identified by name and date of birth. Dmitriy Peña was informed that this is a telemedicine visit and that the visit is being conducted through the Epic Embedded platform. He agrees to proceed..  My office door was closed. No one else was in the room.  He acknowledged consent and understanding of privacy and security of the video platform. The patient has agreed to participate and understands they can discontinue the visit at any time.        Visit Time            [1]   Past Medical History:  Diagnosis Date    Depression    [2]   Past Surgical History:  Procedure  Laterality Date    HERNIA REPAIR      TOE SURGERY     [3]   Allergies  Allergen Reactions    Pollen Extract Allergic Rhinitis

## 2025-06-10 NOTE — TELEPHONE ENCOUNTER
Left voicemail informing patient and/or parent/guardian of the Psych Encounter form needing to be signed as a requirement from the insurance company for billing purposes. Patient can access form via [employer] and sign electronically.     Please make patient aware this form must be signed for each visit as a requirement to continue future visits with provider.    Virtual Encounter form 6/5/25 call #1

## 2025-06-17 ENCOUNTER — TELEPHONE (OUTPATIENT)
Dept: PSYCHIATRY | Facility: CLINIC | Age: 20
End: 2025-06-17

## 2025-06-17 NOTE — TELEPHONE ENCOUNTER
Left voicemail informing patient and/or parent/guardian of the Psych Encounter form needing to be signed as a requirement from the insurance company for billing purposes. Patient can access form via [employer] and sign electronically.     Please make patient aware this form must be signed for each visit as a requirement to continue future visits with provider.

## 2025-06-18 ENCOUNTER — TELEMEDICINE (OUTPATIENT)
Dept: BEHAVIORAL/MENTAL HEALTH CLINIC | Facility: CLINIC | Age: 20
End: 2025-06-18

## 2025-06-18 DIAGNOSIS — F33.0 MILD RECURRENT MAJOR DEPRESSION (HCC): Primary | ICD-10-CM

## 2025-06-18 PROCEDURE — NOSHOW

## 2025-06-18 NOTE — PSYCH
"Virtual Regular VisitName: Dmitriy Peña      : 2005      MRN: [id number]  Encounter Provider: Zach Carr LCSW  Encounter Date: 2025   Encounter department: [facility]  :  Assessment & Plan  Mild recurrent major depression (HCC)             Goals addressed in session: Goal 1     DATA: ***  During this session, this clinician used the following therapeutic modalities: Engagement Strategies, Client-centered Therapy, and Cognitive Behavioral Therapy,     Substance Abuse {Was/was not:38840} addressed during this session. If the client is diagnosed with a co-occurring substance use disorder, please indicate any changes in the frequency or amount of use: ***. Stage of change for addressing substance use diagnoses: {Psych Substance Abuse Stage of Change:79336}    ASSESSMENT:  Dmitriy presents with a {Psych/BH Mood:32872::\"Euthymic/ normal\"} mood. Dmitriy's affect is {Psych/BH Affect:98264::\"Normal range and intensity\"}, which is {Congruent/Non Congruent:31941}, with their mood and the content of the session. The client {HAS/HAS NOT:46019} made progress on their goals as evidenced by ***.    Dmitriy presents with a {PSYCH Suicide/Homicide Risk:61394} risk of suicide, {PSYCH Suicide/Homicide Risk:75184} risk of self-harm, and {PSYCH Suicide/Homicide Risk:16126} risk of harm to others.    For any risk assessment that surpasses a \"low\" rating, a safety plan must be developed.    A safety plan was indicated: {YES/NO:}  If yes, describe in detail ***    PLAN: Between sessions, Dmitriy will ***. At the next session, the therapist will use {Therapeutic Modalities:12727} to address ***.    Behavioral Health Treatment Plan [facility]: Diagnosis and Treatment Plan explained to Dmitriy, Dmitriy relates understanding diagnosis and is agreeable to Treatment Plan. {YES (DEF)/NO:40850}    Depression Follow-up Plan Completed: {YES/NO/NOT APPLICABLE:906003984}   "   Reason for visit is No chief complaint on file.     Recent Visits  No visits were found meeting these conditions.  Showing recent visits within past 7 days and meeting all other requirements  Today's Visits  Date Type Provider Dept   06/18/25 Telemedicine Zach Carr LCSW [facility]   Showing today's visits and meeting all other requirements  Future Appointments  No visits were found meeting these conditions.  Showing future appointments within next 150 days and meeting all other requirements     History of Present Illness {?Quick Links Encounters * My Last Note * Last Note in Specialty * Snapshot * Since Last Visit * History :41613}    HPI    Past Medical History   Past Medical History[1]  Past Surgical History[2]  Current Outpatient Medications   Medication Instructions   • atoMOXetine (STRATTERA) 25 mg, Oral, Daily after breakfast   • chlorhexidine (PERIDEX) 0.12 % solution 15 mL, Mouth/Throat, 2 times daily   • escitalopram (LEXAPRO) 10 mg, Oral, Daily   • hydrOXYzine HCL (ATARAX) 25 mg, Oral, Every 6 hours PRN   • ibuprofen (MOTRIN) 800 mg, Every 8 hours PRN   • ondansetron (ZOFRAN) 4 mg, Oral, Every 8 hours PRN     Allergies[3]    Objective {?Quick Links Trend Vitals * Enter New Vitals * Results Review * Timeline (Adult) * Labs * Imaging * Cardiology * Procedures * Lung Cancer Screening * Surgical eConsent :43428}  There were no vitals taken for this visit.    Video Exam  Physical Exam     Administrative Statements   Encounter provider Zach Carr LCSW    The Patient is located at Home and in the following state in which I hold an active license [place].    The patient was identified by name and date of birth. Dmitriy Peña was informed that this is a telemedicine visit and that the visit is being conducted through the Epic Embedded platform. He agrees to proceed..  My office door was closed. No one else was in the room.  He acknowledged consent and understanding of  privacy and security of the video platform. The patient has agreed to participate and understands they can discontinue the visit at any time.    I have spent a total time of *** minutes in caring for this patient on the day of the visit/encounter including {AMB Counseling Topics:2345038967}, not including the time spent for establishing the audio/video connection.    Visit Time  Start Time: 1400         [1]  Past Medical History:  Diagnosis Date   • Depression    [2]  Past Surgical History:  Procedure Laterality Date   • HERNIA REPAIR     • TOE SURGERY     [3]  Allergies  Allergen Reactions   • Pollen Extract Allergic Rhinitis

## 2025-06-18 NOTE — PSYCH
No Call. No Show. No Charge    Dmitriy Peña no showed 06/18/25 appointment , staff called and left message to reschedule appointment     Treatment Plan not due at this session.

## 2025-06-23 ENCOUNTER — TELEPHONE (OUTPATIENT)
Dept: PSYCHIATRY | Facility: CLINIC | Age: 20
End: 2025-06-23

## 2025-06-23 NOTE — TELEPHONE ENCOUNTER
Spoke to patient and/or parent/guardian to make aware of the Psych Encounter form needing to be signed from recent completed appointment(s).

## 2025-06-30 ENCOUNTER — TELEPHONE (OUTPATIENT)
Dept: PSYCHIATRY | Facility: CLINIC | Age: 20
End: 2025-06-30

## 2025-06-30 NOTE — TELEPHONE ENCOUNTER
Left voicemail informing patient and/or parent/guardian of the Psych Encounter form needing to be signed as a requirement from the insurance company for billing purposes. Patient can access form via [employer] and sign electronically.     Please make patient aware this form must be signed for each visit as a requirement to continue future visits with provider.

## 2025-08-21 ENCOUNTER — HOSPITAL ENCOUNTER (EMERGENCY)
Facility: HOSPITAL | Age: 20
Discharge: HOME | End: 2025-08-21
Attending: EMERGENCY MEDICINE | Admitting: EMERGENCY MEDICINE
Payer: COMMERCIAL

## 2025-08-21 ENCOUNTER — APPOINTMENT (EMERGENCY)
Dept: ULTRASOUND IMAGING | Facility: HOSPITAL | Age: 20
End: 2025-08-21
Payer: COMMERCIAL

## 2025-08-21 ENCOUNTER — APPOINTMENT (EMERGENCY)
Dept: CT IMAGING | Facility: HOSPITAL | Age: 20
End: 2025-08-21
Payer: COMMERCIAL

## 2025-08-21 VITALS
OXYGEN SATURATION: 98 % | HEART RATE: 70 BPM | SYSTOLIC BLOOD PRESSURE: 114 MMHG | TEMPERATURE: 97.7 F | RESPIRATION RATE: 18 BRPM | DIASTOLIC BLOOD PRESSURE: 58 MMHG

## 2025-08-21 DIAGNOSIS — R10.30 GROIN PAIN: Primary | ICD-10-CM

## 2025-08-21 LAB
ALBUMIN SERPL BCG-MCNC: 4.3 G/DL (ref 3.5–5)
ALP SERPL-CCNC: 79 U/L (ref 34–104)
ALT SERPL W P-5'-P-CCNC: 75 U/L (ref 7–52)
ANION GAP SERPL CALCULATED.3IONS-SCNC: 4 MMOL/L (ref 4–13)
AST SERPL W P-5'-P-CCNC: 34 U/L (ref 13–39)
BASOPHILS # BLD AUTO: 0.07 THOUSANDS/ÂΜL (ref 0–0.1)
BASOPHILS NFR BLD AUTO: 1 % (ref 0–1)
BILIRUB SERPL-MCNC: 0.46 MG/DL (ref 0.2–1)
BILIRUB UR QL STRIP: NEGATIVE
BUN SERPL-MCNC: 11 MG/DL (ref 5–25)
CALCIUM SERPL-MCNC: 8.9 MG/DL (ref 8.4–10.2)
CHLORIDE SERPL-SCNC: 105 MMOL/L (ref 96–108)
CLARITY UR: CLEAR
CO2 SERPL-SCNC: 28 MMOL/L (ref 21–32)
COLOR UR: YELLOW
CREAT SERPL-MCNC: 0.86 MG/DL (ref 0.6–1.3)
EOSINOPHIL # BLD AUTO: 0.11 THOUSAND/ÂΜL (ref 0–0.61)
EOSINOPHIL NFR BLD AUTO: 2 % (ref 0–6)
ERYTHROCYTE [DISTWIDTH] IN BLOOD BY AUTOMATED COUNT: 11.4 % (ref 11.6–15.1)
GFR SERPL CREATININE-BSD FRML MDRD: 124 ML/MIN/1.73SQ M
GLUCOSE SERPL-MCNC: 92 MG/DL (ref 65–140)
GLUCOSE UR STRIP-MCNC: NEGATIVE MG/DL
HCT VFR BLD AUTO: 44.9 % (ref 36.5–49.3)
HGB BLD-MCNC: 14.9 G/DL (ref 12–17)
HGB UR QL STRIP.AUTO: NEGATIVE
IMM GRANULOCYTES # BLD AUTO: 0.02 THOUSAND/UL (ref 0–0.2)
IMM GRANULOCYTES NFR BLD AUTO: 0 % (ref 0–2)
KETONES UR STRIP-MCNC: NEGATIVE MG/DL
LEUKOCYTE ESTERASE UR QL STRIP: NEGATIVE
LIPASE SERPL-CCNC: 27 U/L (ref 11–82)
LYMPHOCYTES # BLD AUTO: 2.3 THOUSANDS/ÂΜL (ref 0.6–4.47)
LYMPHOCYTES NFR BLD AUTO: 38 % (ref 14–44)
MCH RBC QN AUTO: 29 PG (ref 26.8–34.3)
MCHC RBC AUTO-ENTMCNC: 33.2 G/DL (ref 31.4–37.4)
MCV RBC AUTO: 88 FL (ref 82–98)
MONOCYTES # BLD AUTO: 0.71 THOUSAND/ÂΜL (ref 0.17–1.22)
MONOCYTES NFR BLD AUTO: 12 % (ref 4–12)
NEUTROPHILS # BLD AUTO: 2.78 THOUSANDS/ÂΜL (ref 1.85–7.62)
NEUTS SEG NFR BLD AUTO: 47 % (ref 43–75)
NITRITE UR QL STRIP: NEGATIVE
NRBC BLD AUTO-RTO: 0 /100 WBCS
PH UR STRIP.AUTO: 7 [PH]
PLATELET # BLD AUTO: 234 THOUSANDS/UL (ref 149–390)
PMV BLD AUTO: 10.2 FL (ref 8.9–12.7)
POTASSIUM SERPL-SCNC: 4 MMOL/L (ref 3.5–5.3)
PROT SERPL-MCNC: 7.2 G/DL (ref 6.4–8.4)
PROT UR STRIP-MCNC: NEGATIVE MG/DL
RBC # BLD AUTO: 5.13 MILLION/UL (ref 3.88–5.62)
SODIUM SERPL-SCNC: 137 MMOL/L (ref 135–147)
SP GR UR STRIP.AUTO: 1.01
UROBILINOGEN UR QL STRIP.AUTO: 0.2 E.U./DL
WBC # BLD AUTO: 5.99 THOUSAND/UL (ref 4.31–10.16)

## 2025-08-21 PROCEDURE — 36415 COLL VENOUS BLD VENIPUNCTURE: CPT | Performed by: EMERGENCY MEDICINE

## 2025-08-21 PROCEDURE — 99284 EMERGENCY DEPT VISIT MOD MDM: CPT

## 2025-08-21 PROCEDURE — 99285 EMERGENCY DEPT VISIT HI MDM: CPT | Performed by: EMERGENCY MEDICINE

## 2025-08-21 PROCEDURE — 74177 CT ABD & PELVIS W/CONTRAST: CPT

## 2025-08-21 PROCEDURE — 85025 COMPLETE CBC W/AUTO DIFF WBC: CPT | Performed by: EMERGENCY MEDICINE

## 2025-08-21 PROCEDURE — 83690 ASSAY OF LIPASE: CPT | Performed by: EMERGENCY MEDICINE

## 2025-08-21 PROCEDURE — 76870 US EXAM SCROTUM: CPT

## 2025-08-21 PROCEDURE — 80053 COMPREHEN METABOLIC PANEL: CPT | Performed by: EMERGENCY MEDICINE

## 2025-08-21 PROCEDURE — 81003 URINALYSIS AUTO W/O SCOPE: CPT | Performed by: EMERGENCY MEDICINE

## 2025-08-21 RX ORDER — KETOROLAC TROMETHAMINE 30 MG/ML
15 INJECTION, SOLUTION INTRAMUSCULAR; INTRAVENOUS ONCE
Status: DISCONTINUED | OUTPATIENT
Start: 2025-08-21 | End: 2025-08-21 | Stop reason: HOSPADM

## 2025-08-21 RX ORDER — KETOROLAC TROMETHAMINE 10 MG/1
10 TABLET, FILM COATED ORAL EVERY 6 HOURS PRN
Qty: 8 TABLET | Refills: 0 | Status: SHIPPED | OUTPATIENT
Start: 2025-08-21

## 2025-08-21 RX ADMIN — IOHEXOL 100 ML: 350 INJECTION, SOLUTION INTRAVENOUS at 13:39
